# Patient Record
Sex: FEMALE | Race: BLACK OR AFRICAN AMERICAN | NOT HISPANIC OR LATINO | Employment: OTHER | RURAL
[De-identification: names, ages, dates, MRNs, and addresses within clinical notes are randomized per-mention and may not be internally consistent; named-entity substitution may affect disease eponyms.]

---

## 2017-08-01 ENCOUNTER — HISTORICAL (OUTPATIENT)
Dept: ADMINISTRATIVE | Facility: HOSPITAL | Age: 82
End: 2017-08-01

## 2017-08-04 LAB
LAB AP GENERAL CAT - HISTORICAL: NORMAL
LAB AP INTERPRETATION/RESULT - HISTORICAL: NORMAL
LAB AP SPECIMEN ADEQUACY - HISTORICAL: NORMAL
LAB AP SPECIMEN SUBMITTED - HISTORICAL: NORMAL

## 2017-10-09 ENCOUNTER — HISTORICAL (OUTPATIENT)
Dept: ADMINISTRATIVE | Facility: HOSPITAL | Age: 82
End: 2017-10-09

## 2017-10-10 LAB
LAB AP CLINICAL INFORMATION: NORMAL
LAB AP DIAGNOSIS - HISTORICAL: NORMAL
LAB AP GROSS PATHOLOGY - HISTORICAL: NORMAL
LAB AP SPECIMEN SUBMITTED - HISTORICAL: NORMAL

## 2020-05-31 ENCOUNTER — HISTORICAL (OUTPATIENT)
Dept: ADMINISTRATIVE | Facility: HOSPITAL | Age: 85
End: 2020-05-31

## 2020-06-01 LAB — TSH SERPL DL<=0.005 MIU/L-ACNC: 1.08 UIU/ML (ref 0.36–3.74)

## 2020-06-28 ENCOUNTER — HISTORICAL (OUTPATIENT)
Dept: ADMINISTRATIVE | Facility: HOSPITAL | Age: 85
End: 2020-06-28

## 2020-06-28 LAB
BUN SERPL-MCNC: 16 MG/DL (ref 7–17)
CALCIUM SERPL-MCNC: 9.1 MG/DL (ref 8.5–10.1)
CHLORIDE SERPL-SCNC: 102 MMOL/L (ref 98–107)
CO2 SERPL-SCNC: 32 MMOL/L (ref 21–32)
CREAT SERPL-MCNC: 1.25 MG/DL (ref 0.55–1.3)
GLUCOSE SERPL-MCNC: 129 MG/DL (ref 74–106)
POTASSIUM SERPL-SCNC: 4.4 MMOL/L (ref 3.5–5.1)
SODIUM SERPL-SCNC: 141 MMOL/L (ref 136–145)

## 2020-06-29 LAB
CHOLEST SERPL-MCNC: 180 MG/DL
VIT B12 SERPL-MCNC: 461 PG/ML (ref 193–986)

## 2020-06-30 LAB — GABAPENTIN SERPLBLD-MCNC: 5.9 UG/ML (ref 2–20)

## 2020-07-23 ENCOUNTER — HISTORICAL (OUTPATIENT)
Dept: ADMINISTRATIVE | Facility: HOSPITAL | Age: 85
End: 2020-07-23

## 2020-07-23 LAB
ALBUMIN SERPL BCP-MCNC: 3.4 G/DL (ref 3.4–5)
ALBUMIN/GLOB SERPL: 0.8 {RATIO}
ALP SERPL-CCNC: 128 U/L (ref 46–116)
ALT SERPL W P-5'-P-CCNC: 16 U/L (ref 14–63)
ANION GAP SERPL CALCULATED.3IONS-SCNC: 8 MMOL/L
AST SERPL W P-5'-P-CCNC: 21 U/L (ref 15–37)
BASOPHILS # BLD AUTO: 0.04 X10E3/UL (ref 0–0.2)
BASOPHILS NFR BLD AUTO: 0.9 % (ref 0–1)
BILIRUB SERPL-MCNC: 0.3 MG/DL (ref 0.2–1)
BUN SERPL-MCNC: 14 MG/DL (ref 7–17)
BUN/CREAT SERPL: 11.6
CALCIUM SERPL-MCNC: 9.2 MG/DL (ref 8.5–10.1)
CHLORIDE SERPL-SCNC: 104 MMOL/L (ref 98–107)
CO2 SERPL-SCNC: 31 MMOL/L (ref 21–32)
CREAT SERPL-MCNC: 1.21 MG/DL (ref 0.55–1.3)
EOSINOPHIL # BLD AUTO: 0.07 X10E3/UL (ref 0–0.5)
EOSINOPHIL NFR BLD AUTO: 1.6 % (ref 1–4)
ERYTHROCYTE [DISTWIDTH] IN BLOOD BY AUTOMATED COUNT: 19.4 % (ref 11.5–14.5)
GLOBULIN SER-MCNC: 4.3 G/DL
GLUCOSE SERPL-MCNC: 142 MG/DL (ref 74–106)
HCT VFR BLD AUTO: 32.3 % (ref 38–47)
HGB BLD-MCNC: 11.1 G/DL (ref 12–16)
HYPOCHROMIA BLD QL SMEAR: ABNORMAL
LYMPHOCYTES # BLD AUTO: 1.33 X10E3/UL (ref 1–4.8)
LYMPHOCYTES NFR BLD AUTO: 30.7 % (ref 27–41)
MCH RBC QN AUTO: 23.7 PG (ref 27–31)
MCHC RBC AUTO-ENTMCNC: 34.4 G/DL (ref 32–36)
MCV RBC AUTO: 69 FL (ref 80–96)
MICROCYTES BLD QL SMEAR: ABNORMAL
MONOCYTES # BLD AUTO: 0.41 X10E3/UL (ref 0–0.8)
MONOCYTES NFR BLD AUTO: 9.5 % (ref 2–6)
MPC BLD CALC-MCNC: 10.3 FL (ref 9.4–12.4)
NEUTROPHILS # BLD AUTO: 2.48 X10E3/UL (ref 1.8–7.7)
NEUTROPHILS NFR BLD AUTO: 57.3 % (ref 53–65)
NT-PROBNP SERPL-MCNC: 37 PG/ML (ref 5–450)
PLATELET # BLD AUTO: 258 X10E3/UL (ref 150–400)
PLATELET MORPHOLOGY: ABNORMAL
POTASSIUM SERPL-SCNC: 4.3 MMOL/L (ref 3.5–5.1)
PROT SERPL-MCNC: 7.7 G/DL (ref 6.4–8.2)
RBC # BLD AUTO: 4.68 X10E6/UL (ref 4.2–5.4)
SODIUM SERPL-SCNC: 139 MMOL/L (ref 136–145)
TARGETS BLD QL SMEAR: ABNORMAL
WBC # BLD AUTO: 4.33 X10E3/UL (ref 4.5–11)

## 2020-07-24 LAB
EST. AVERAGE GLUCOSE BLD GHB EST-MCNC: 117 MG/DL
HBA1C MFR BLD HPLC: 6.1 %

## 2020-08-26 ENCOUNTER — HISTORICAL (OUTPATIENT)
Dept: ADMINISTRATIVE | Facility: HOSPITAL | Age: 85
End: 2020-08-26

## 2020-08-26 LAB
ANISOCYTOSIS BLD QL SMEAR: ABNORMAL
BASOPHILS # BLD AUTO: 0.04 X10E3/UL (ref 0–0.2)
BASOPHILS NFR BLD AUTO: 0.8 % (ref 0–1)
EOSINOPHIL # BLD AUTO: 0.08 X10E3/UL (ref 0–0.5)
EOSINOPHIL NFR BLD AUTO: 1.6 % (ref 1–4)
ERYTHROCYTE [DISTWIDTH] IN BLOOD BY AUTOMATED COUNT: 19 % (ref 11.5–14.5)
HCT VFR BLD AUTO: 33.1 % (ref 38–47)
HGB BLD-MCNC: 11.5 G/DL (ref 12–16)
HYPOCHROMIA BLD QL SMEAR: ABNORMAL
LYMPHOCYTES # BLD AUTO: 1.47 X10E3/UL (ref 1–4.8)
LYMPHOCYTES NFR BLD AUTO: 30.2 % (ref 27–41)
MCH RBC QN AUTO: 24 PG (ref 27–31)
MCHC RBC AUTO-ENTMCNC: 34.7 G/DL (ref 32–36)
MCV RBC AUTO: 69 FL (ref 80–96)
MICROCYTES BLD QL SMEAR: ABNORMAL
MONOCYTES # BLD AUTO: 0.47 X10E3/UL (ref 0–0.8)
MONOCYTES NFR BLD AUTO: 9.7 % (ref 2–6)
MPC BLD CALC-MCNC: 10 FL (ref 9.4–12.4)
NEUTROPHILS # BLD AUTO: 2.81 X10E3/UL (ref 1.8–7.7)
NEUTROPHILS NFR BLD AUTO: 57.7 % (ref 53–65)
PLATELET # BLD AUTO: 258 X10E3/UL (ref 150–400)
PLATELET MORPHOLOGY: ABNORMAL
RBC # BLD AUTO: 4.8 X10E6/UL (ref 4.2–5.4)
TARGETS BLD QL SMEAR: ABNORMAL
WBC # BLD AUTO: 4.87 X10E3/UL (ref 4.5–11)

## 2020-08-27 LAB — TSH SERPL DL<=0.005 MIU/L-ACNC: 1.99 UIU/ML (ref 0.36–3.74)

## 2020-09-30 ENCOUNTER — HISTORICAL (OUTPATIENT)
Dept: ADMINISTRATIVE | Facility: HOSPITAL | Age: 85
End: 2020-09-30

## 2020-09-30 LAB
ALBUMIN SERPL BCP-MCNC: 3 G/DL (ref 3.4–5)
ALBUMIN/GLOB SERPL: 0.7 {RATIO}
ALP SERPL-CCNC: 139 U/L (ref 46–116)
ALT SERPL W P-5'-P-CCNC: 36 U/L (ref 14–63)
ANION GAP SERPL CALCULATED.3IONS-SCNC: 12 MMOL/L
ANISOCYTOSIS BLD QL SMEAR: ABNORMAL
AST SERPL W P-5'-P-CCNC: 40 U/L (ref 15–37)
BASOPHILS # BLD AUTO: 0.01 X10E3/UL (ref 0–0.2)
BASOPHILS NFR BLD AUTO: 0.2 % (ref 0–1)
BILIRUB SERPL-MCNC: 0.6 MG/DL (ref 0.2–1)
BUN SERPL-MCNC: 20 MG/DL (ref 7–17)
BUN/CREAT SERPL: 20.8
CALCIUM SERPL-MCNC: 9.4 MG/DL (ref 8.5–10.1)
CHLORIDE SERPL-SCNC: 108 MMOL/L (ref 98–107)
CO2 SERPL-SCNC: 32 MMOL/L (ref 21–32)
CREAT SERPL-MCNC: 0.96 MG/DL (ref 0.55–1.3)
D DIMER PPP FEU-MCNC: 1.27 UG/ML (ref 0–0.47)
EOSINOPHIL # BLD AUTO: 0 X10E3/UL (ref 0–0.5)
EOSINOPHIL NFR BLD AUTO: 0 % (ref 1–4)
ERYTHROCYTE [DISTWIDTH] IN BLOOD BY AUTOMATED COUNT: 18.1 % (ref 11.5–14.5)
GLOBULIN SER-MCNC: 4.1 G/DL
GLUCOSE SERPL-MCNC: 130 MG/DL (ref 74–106)
HCT VFR BLD AUTO: 32.8 % (ref 38–47)
HGB BLD-MCNC: 11.4 G/DL (ref 12–16)
HYPOCHROMIA BLD QL SMEAR: ABNORMAL
LYMPHOCYTES # BLD AUTO: 0.71 X10E3/UL (ref 1–4.8)
LYMPHOCYTES NFR BLD AUTO: 15 % (ref 27–41)
LYMPHOCYTES NFR BLD MANUAL: 12 % (ref 27–41)
MCH RBC QN AUTO: 23.6 PG (ref 27–31)
MCHC RBC AUTO-ENTMCNC: 34.8 G/DL (ref 32–36)
MCV RBC AUTO: 68 FL (ref 80–96)
MICROCYTES BLD QL SMEAR: ABNORMAL
MONOCYTES # BLD AUTO: 0.7 X10E3/UL (ref 0–0.8)
MONOCYTES NFR BLD AUTO: 14.8 % (ref 2–6)
MONOCYTES NFR BLD MANUAL: 13 % (ref 2–6)
MPC BLD CALC-MCNC: 10.3 FL (ref 9.4–12.4)
NEUTROPHILS # BLD AUTO: 3.3 X10E3/UL (ref 1.8–7.7)
NEUTROPHILS NFR BLD AUTO: 70 % (ref 53–65)
NEUTS BAND NFR BLD MANUAL: 1 % (ref 1–5)
NEUTS SEG NFR BLD MANUAL: 74 % (ref 50–62)
PLATELET # BLD AUTO: 247 X10E3/UL (ref 150–400)
PLATELET MORPHOLOGY: ABNORMAL
POTASSIUM SERPL-SCNC: 4.4 MMOL/L (ref 3.5–5.1)
PROT SERPL-MCNC: 7.1 G/DL (ref 6.4–8.2)
RBC # BLD AUTO: 4.84 X10E6/UL (ref 4.2–5.4)
SODIUM SERPL-SCNC: 148 MMOL/L (ref 136–145)
TARGETS BLD QL SMEAR: ABNORMAL
WBC # BLD AUTO: 4.72 X10E3/UL (ref 4.5–11)

## 2020-10-02 ENCOUNTER — HISTORICAL (OUTPATIENT)
Dept: ADMINISTRATIVE | Facility: HOSPITAL | Age: 85
End: 2020-10-02

## 2020-10-02 LAB
ALBUMIN SERPL BCP-MCNC: 2.8 G/DL (ref 3.4–5)
ALBUMIN/GLOB SERPL: 0.7 {RATIO}
ALP SERPL-CCNC: 128 U/L (ref 46–116)
ALT SERPL W P-5'-P-CCNC: 28 U/L (ref 14–63)
ANION GAP SERPL CALCULATED.3IONS-SCNC: 8 MMOL/L
AST SERPL W P-5'-P-CCNC: 31 U/L (ref 15–37)
BACTERIA #/AREA URNS HPF: ABNORMAL /HPF
BASOPHILS # BLD AUTO: 0.01 X10E3/UL (ref 0–0.2)
BASOPHILS NFR BLD AUTO: 0.2 % (ref 0–1)
BILIRUB SERPL-MCNC: 0.5 MG/DL (ref 0.2–1)
BILIRUB UR QL STRIP: ABNORMAL MG/DL
BUN SERPL-MCNC: 20 MG/DL (ref 7–17)
BUN/CREAT SERPL: 19.8
CALCIUM SERPL-MCNC: 8.8 MG/DL (ref 8.5–10.1)
CHLORIDE SERPL-SCNC: 109 MMOL/L (ref 98–107)
CLARITY UR: ABNORMAL
CLARITY UR: ABNORMAL
CO2 SERPL-SCNC: 35 MMOL/L (ref 21–32)
COLOR UR: ABNORMAL
COLOR UR: ABNORMAL
CREAT SERPL-MCNC: 1.01 MG/DL (ref 0.55–1.3)
CRP SERPL-MCNC: 3.41 MG/DL (ref 0–0.8)
EOSINOPHIL # BLD AUTO: 0.01 X10E3/UL (ref 0–0.5)
EOSINOPHIL NFR BLD AUTO: 0.2 % (ref 1–4)
EOSINOPHIL NFR BLD MANUAL: 2 % (ref 1–4)
ERYTHROCYTE [DISTWIDTH] IN BLOOD BY AUTOMATED COUNT: 18.1 % (ref 11.5–14.5)
GLOBULIN SER-MCNC: 3.9 G/DL
GLUCOSE SERPL-MCNC: 91 MG/DL (ref 74–106)
GLUCOSE UR STRIP-MCNC: NORMAL MG/DL
HCT VFR BLD AUTO: 33 % (ref 38–47)
HGB BLD-MCNC: 11.3 G/DL (ref 12–16)
HYPOCHROMIA BLD QL SMEAR: ABNORMAL
KETONES UR STRIP-SCNC: NEGATIVE MG/DL
LEUKOCYTE ESTERASE UR QL STRIP: NEGATIVE LEU/UL
LYMPHOCYTES # BLD AUTO: 0.75 X10E3/UL (ref 1–4.8)
LYMPHOCYTES NFR BLD AUTO: 17 % (ref 27–41)
LYMPHOCYTES NFR BLD MANUAL: 15 % (ref 27–41)
MCH RBC QN AUTO: 23.5 PG (ref 27–31)
MCHC RBC AUTO-ENTMCNC: 34.2 G/DL (ref 32–36)
MCV RBC AUTO: 69 FL (ref 80–96)
MICROCYTES BLD QL SMEAR: ABNORMAL
MONOCYTES # BLD AUTO: 0.65 X10E3/UL (ref 0–0.8)
MONOCYTES NFR BLD AUTO: 14.7 % (ref 2–6)
MONOCYTES NFR BLD MANUAL: 13 % (ref 2–6)
MPC BLD CALC-MCNC: 9.7 FL (ref 9.4–12.4)
MUCOUS THREADS #/AREA URNS HPF: ABNORMAL /HPF
NEUTROPHILS # BLD AUTO: 3 X10E3/UL (ref 1.8–7.7)
NEUTROPHILS NFR BLD AUTO: 67.9 % (ref 53–65)
NEUTS SEG NFR BLD MANUAL: 70 % (ref 50–62)
NITRITE UR QL STRIP: NEGATIVE MG/DL
PH UR STRIP: 6 PH UNITS (ref 5–8)
PLATELET # BLD AUTO: 269 X10E3/UL (ref 150–400)
PLATELET MORPHOLOGY: NORMAL
POTASSIUM SERPL-SCNC: 3.9 MMOL/L (ref 3.5–5.1)
PROT SERPL-MCNC: 6.7 G/DL (ref 6.4–8.2)
PROT UR QL STRIP: >=300 MG/DL
RBC # BLD AUTO: 4.8 X10E6/UL (ref 4.2–5.4)
RBC # UR STRIP: NEGATIVE ERY/UL
RBC #/AREA URNS HPF: ABNORMAL /HPF (ref 0–3)
SODIUM SERPL-SCNC: 148 MMOL/L (ref 136–145)
SP GR UR STRIP: 1.02 (ref 1–1.03)
SQUAMOUS #/AREA URNS LPF: ABNORMAL /LPF
TARGETS BLD QL SMEAR: ABNORMAL
UROBILINOGEN UR STRIP-ACNC: 4 MG/DL
WBC # BLD AUTO: 4.42 X10E3/UL (ref 4.5–11)
WBC #/AREA URNS HPF: ABNORMAL /HPF (ref 0–5)

## 2020-10-03 ENCOUNTER — HISTORICAL (OUTPATIENT)
Dept: ADMINISTRATIVE | Facility: HOSPITAL | Age: 85
End: 2020-10-03

## 2020-10-15 ENCOUNTER — HISTORICAL (OUTPATIENT)
Dept: ADMINISTRATIVE | Facility: HOSPITAL | Age: 85
End: 2020-10-15

## 2020-10-15 LAB
ALBUMIN SERPL BCP-MCNC: 2.2 G/DL (ref 3.4–5)
ALBUMIN SERPL BCP-MCNC: 2.3 G/DL (ref 3.4–5)
ALBUMIN/GLOB SERPL: 0.4 {RATIO}
ALBUMIN/GLOB SERPL: 0.4 {RATIO}
ALP SERPL-CCNC: 177 U/L (ref 46–116)
ALP SERPL-CCNC: 212 U/L (ref 46–116)
ALT SERPL W P-5'-P-CCNC: 102 U/L (ref 14–63)
ALT SERPL W P-5'-P-CCNC: 116 U/L (ref 14–63)
AMORPH PHOS CRY #/AREA URNS LPF: ABNORMAL /LPF
AMORPH PHOS CRY #/AREA URNS LPF: ABNORMAL /LPF
ANION GAP SERPL CALCULATED.3IONS-SCNC: 14 MMOL/L
ANION GAP SERPL CALCULATED.3IONS-SCNC: 15 MMOL/L
AST SERPL W P-5'-P-CCNC: 115 U/L (ref 15–37)
AST SERPL W P-5'-P-CCNC: 75 U/L (ref 15–37)
BACTERIA #/AREA URNS HPF: ABNORMAL /HPF
BACTERIA #/AREA URNS HPF: ABNORMAL /HPF
BASOPHILS # BLD AUTO: 0 X10E3/UL (ref 0–0.2)
BASOPHILS NFR BLD AUTO: 0 % (ref 0–1)
BILIRUB SERPL-MCNC: 1 MG/DL (ref 0.2–1)
BILIRUB SERPL-MCNC: 1.2 MG/DL (ref 0.2–1)
BILIRUB UR QL STRIP: NEGATIVE MG/DL
BILIRUB UR QL STRIP: NEGATIVE MG/DL
BUN SERPL-MCNC: 37 MG/DL (ref 7–17)
BUN SERPL-MCNC: 37 MG/DL (ref 7–17)
BUN SERPL-MCNC: 38 MG/DL (ref 7–17)
BUN/CREAT SERPL: 28.4
BUN/CREAT SERPL: 28.9
CALCIUM SERPL-MCNC: 10.3 MG/DL (ref 8.5–10.1)
CALCIUM SERPL-MCNC: 10.4 MG/DL (ref 8.5–10.1)
CHLORIDE SERPL-SCNC: 105 MMOL/L (ref 98–107)
CHLORIDE SERPL-SCNC: 106 MMOL/L (ref 98–107)
CLARITY UR: ABNORMAL
CLARITY UR: CLEAR
CLARITY UR: CLEAR
CO2 SERPL-SCNC: 28 MMOL/L (ref 21–32)
CO2 SERPL-SCNC: 29 MMOL/L (ref 21–32)
COLOR UR: ABNORMAL
CREAT SERPL-MCNC: 1.23 MG/DL (ref 0.55–1.3)
CREAT SERPL-MCNC: 1.28 MG/DL (ref 0.55–1.3)
CREAT SERPL-MCNC: 1.34 MG/DL (ref 0.55–1.3)
EOSINOPHIL # BLD AUTO: 0 X10E3/UL (ref 0–0.5)
EOSINOPHIL NFR BLD AUTO: 0 % (ref 1–4)
ERYTHROCYTE [DISTWIDTH] IN BLOOD BY AUTOMATED COUNT: 18.7 % (ref 11.5–14.5)
GLOBULIN SER-MCNC: 5.5 G/DL
GLOBULIN SER-MCNC: 5.8 G/DL
GLUCOSE SERPL-MCNC: 185 MG/DL (ref 74–106)
GLUCOSE SERPL-MCNC: 231 MG/DL (ref 74–106)
GLUCOSE UR STRIP-MCNC: NEGATIVE MG/DL
GLUCOSE UR STRIP-MCNC: NORMAL MG/DL
GRAN CASTS #/AREA URNS LPF: ABNORMAL /LPF
HCT VFR BLD AUTO: 36.6 % (ref 38–47)
HGB BLD-MCNC: 13 G/DL (ref 12–16)
HYALINE CASTS #/AREA URNS LPF: ABNORMAL /LPF (ref 0–2)
KETONES UR STRIP-SCNC: NEGATIVE MG/DL
KETONES UR STRIP-SCNC: NEGATIVE MG/DL
LEUKOCYTE ESTERASE UR QL STRIP: NEGATIVE LEU/UL
LEUKOCYTE ESTERASE UR QL STRIP: NEGATIVE LEU/UL
LYMPHOCYTES # BLD AUTO: 0.58 X10E3/UL (ref 1–4.8)
LYMPHOCYTES NFR BLD AUTO: 4.9 % (ref 27–41)
MCH RBC QN AUTO: 23.6 PG (ref 27–31)
MCHC RBC AUTO-ENTMCNC: 35.5 G/DL (ref 32–36)
MCV RBC AUTO: 66 FL (ref 80–96)
MONOCYTES # BLD AUTO: 1.39 X10E3/UL (ref 0–0.8)
MONOCYTES NFR BLD AUTO: 11.8 % (ref 2–6)
MPC BLD CALC-MCNC: 10.2 FL (ref 9.4–12.4)
MUCOUS THREADS #/AREA URNS HPF: ABNORMAL /HPF
NEUTROPHILS # BLD AUTO: 9.8 X10E3/UL (ref 1.8–7.7)
NEUTROPHILS NFR BLD AUTO: 83.3 % (ref 53–65)
NITRITE UR QL STRIP: NEGATIVE
NITRITE UR QL STRIP: NEGATIVE MG/DL
NT-PROBNP SERPL-MCNC: 662 PG/ML (ref 5–450)
PH UR STRIP: 5.5 PH UNITS (ref 5–8)
PH UR STRIP: 5.5 PH UNITS (ref 5–8)
PLATELET # BLD AUTO: 305 X10E3/UL (ref 150–400)
POTASSIUM SERPL-SCNC: 3.9 MMOL/L (ref 3.5–5.1)
POTASSIUM SERPL-SCNC: 4.2 MMOL/L (ref 3.5–5.1)
PROT SERPL-MCNC: 7.7 G/DL (ref 6.4–8.2)
PROT SERPL-MCNC: 8.1 G/DL (ref 6.4–8.2)
PROT UR QL STRIP: 100 MG/DL
PROT UR QL STRIP: 100 MG/DL
RBC # BLD AUTO: 5.51 X10E6/UL (ref 4.2–5.4)
RBC # UR STRIP: ABNORMAL ERY/UL
RBC # UR STRIP: NEGATIVE ERY/UL
RBC #/AREA URNS HPF: ABNORMAL /HPF (ref 0–3)
RBC #/AREA URNS HPF: ABNORMAL /HPF (ref 0–3)
SARS-COV-2 RNA AMPLIFICATION, QUAL: POSITIVE
SODIUM SERPL-SCNC: 144 MMOL/L (ref 136–145)
SODIUM SERPL-SCNC: 145 MMOL/L (ref 136–145)
SP GR UR STRIP: 1.02 (ref 1–1.03)
SP GR UR STRIP: >=1.03 (ref 1–1.03)
SQUAMOUS #/AREA URNS LPF: ABNORMAL /LPF
SQUAMOUS #/AREA URNS LPF: ABNORMAL /LPF
TRICHOMONAS #/AREA URNS HPF: ABNORMAL /HPF
TROPONIN I SERPL-MCNC: <0.017 NG/ML (ref 0–0.06)
TROPONIN I SERPL-MCNC: <0.017 NG/ML (ref 0–0.06)
UROBILINOGEN UR STRIP-ACNC: 0.2 MG/DL
UROBILINOGEN UR STRIP-ACNC: 1 EU/DL
WBC # BLD AUTO: 11.77 X10E3/UL (ref 4.5–11)
WBC #/AREA URNS HPF: ABNORMAL /HPF (ref 0–5)
WBC #/AREA URNS HPF: ABNORMAL /HPF (ref 0–5)
YEAST #/AREA URNS HPF: ABNORMAL /HPF

## 2020-10-16 ENCOUNTER — HISTORICAL (OUTPATIENT)
Dept: ADMINISTRATIVE | Facility: HOSPITAL | Age: 85
End: 2020-10-16

## 2020-10-16 LAB
ANION GAP SERPL CALCULATED.3IONS-SCNC: 13 MMOL/L
ANISOCYTOSIS BLD QL SMEAR: ABNORMAL
APTT PPP: 50.8 SECONDS (ref 25.2–37.3)
APTT PPP: 56.4 SECONDS (ref 25.2–37.3)
APTT PPP: 61.2 SECONDS (ref 25.2–37.3)
BASOPHILS # BLD AUTO: 0.01 X10E3/UL (ref 0–0.2)
BASOPHILS NFR BLD AUTO: 0.1 % (ref 0–1)
BUN SERPL-MCNC: 27 MG/DL (ref 7–18)
CALCIUM SERPL-MCNC: 9.4 MG/DL (ref 8.5–10.1)
CHLORIDE SERPL-SCNC: 108 MMOL/L (ref 98–107)
CO2 SERPL-SCNC: 30 MMOL/L (ref 21–32)
CREAT SERPL-MCNC: 1.06 MG/DL (ref 0.55–1.02)
CRENATED CELLS: ABNORMAL
EOSINOPHIL # BLD AUTO: 0 X10E3/UL (ref 0–0.5)
EOSINOPHIL NFR BLD AUTO: 0 % (ref 1–4)
ERYTHROCYTE [DISTWIDTH] IN BLOOD BY AUTOMATED COUNT: 17 % (ref 11.5–14.5)
GLUCOSE SERPL-MCNC: 275 MG/DL (ref 74–106)
HCT VFR BLD AUTO: 37.3 % (ref 38–47)
HGB BLD-MCNC: 12.9 G/DL (ref 12–16)
IMM GRANULOCYTES # BLD AUTO: 0.08 X10E3/UL (ref 0–0.04)
IMM GRANULOCYTES NFR BLD: 0.5 % (ref 0–0.4)
INR BLD: 1.43 (ref 0–3.3)
LYMPHOCYTES # BLD AUTO: 0.46 X10E3/UL (ref 1–4.8)
LYMPHOCYTES NFR BLD AUTO: 3.1 % (ref 27–41)
LYMPHOCYTES NFR BLD MANUAL: 5 % (ref 27–41)
MAGNESIUM SERPL-MCNC: 2.4 MG/DL (ref 1.7–2.3)
MCH RBC QN AUTO: 23.5 PG (ref 27–31)
MCHC RBC AUTO-ENTMCNC: 34.6 G/DL (ref 32–36)
MCV RBC AUTO: 67.9 FL (ref 80–96)
MICROCYTES BLD QL SMEAR: ABNORMAL
MONOCYTES # BLD AUTO: 1.13 X10E3/UL (ref 0–0.8)
MONOCYTES NFR BLD AUTO: 7.7 % (ref 2–6)
MONOCYTES NFR BLD MANUAL: 9 % (ref 2–6)
MPC BLD CALC-MCNC: 10.5 FL (ref 9.4–12.4)
NEUTROPHILS # BLD AUTO: 12.98 X10E3/UL (ref 1.8–7.7)
NEUTROPHILS NFR BLD AUTO: 88.6 % (ref 53–65)
NEUTS BAND NFR BLD MANUAL: 3 % (ref 1–5)
NEUTS SEG NFR BLD MANUAL: 83 % (ref 50–62)
NRBC # BLD AUTO: 0 X10E3/UL (ref 0–0)
NRBC, AUTO (.00): 0 /100 (ref 0–0)
PLATELET # BLD AUTO: 316 X10E3/UL (ref 150–400)
PLATELET MORPHOLOGY: ABNORMAL
POLYCHROMASIA BLD QL SMEAR: ABNORMAL
POTASSIUM SERPL-SCNC: 3.8 MMOL/L (ref 3.5–5.1)
PROTHROMBIN TIME: 16.7 SECONDS (ref 11.7–14.7)
RBC # BLD AUTO: 5.49 X10E6/UL (ref 4.2–5.4)
SODIUM SERPL-SCNC: 147 MMOL/L (ref 136–145)
TARGETS BLD QL SMEAR: ABNORMAL
TROPONIN I SERPL-MCNC: <0.017 NG/ML (ref 0–0.06)
TROPONIN I SERPL-MCNC: <0.017 NG/ML (ref 0–0.06)
TSH SERPL DL<=0.005 MIU/L-ACNC: 0.18 UIU/ML (ref 0.36–3.74)
WBC # BLD AUTO: 14.66 X10E3/UL (ref 4.5–11)

## 2020-10-17 ENCOUNTER — HISTORICAL (OUTPATIENT)
Dept: ADMINISTRATIVE | Facility: HOSPITAL | Age: 85
End: 2020-10-17

## 2020-10-17 LAB
APTT PPP: 103.5 SECONDS (ref 25.2–37.3)
APTT PPP: 106.2 SECONDS (ref 25.2–37.3)
APTT PPP: 72.4 SECONDS (ref 25.2–37.3)

## 2020-10-18 ENCOUNTER — HISTORICAL (OUTPATIENT)
Dept: ADMINISTRATIVE | Facility: HOSPITAL | Age: 85
End: 2020-10-18

## 2020-10-18 LAB
APTT PPP: 28.6 SECONDS (ref 25.2–37.3)
APTT PPP: 31.8 SECONDS (ref 25.2–37.3)
APTT PPP: 33.7 SECONDS (ref 25.2–37.3)
APTT PPP: 33.7 SECONDS (ref 25.2–37.3)
REPORT: NO GROWTH
REPORT: NORMAL

## 2020-10-19 ENCOUNTER — HISTORICAL (OUTPATIENT)
Dept: ADMINISTRATIVE | Facility: HOSPITAL | Age: 85
End: 2020-10-19

## 2020-10-19 LAB
APTT PPP: 31.4 SECONDS (ref 25.2–37.3)
APTT PPP: 33.2 SECONDS (ref 25.2–37.3)
APTT PPP: 33.2 SECONDS (ref 25.2–37.3)
APTT PPP: 34.8 SECONDS (ref 25.2–37.3)

## 2020-10-20 ENCOUNTER — HISTORICAL (OUTPATIENT)
Dept: ADMINISTRATIVE | Facility: HOSPITAL | Age: 85
End: 2020-10-20

## 2020-10-20 LAB
ALBUMIN SERPL BCP-MCNC: 2.1 G/DL (ref 3.5–5)
ALBUMIN/GLOB SERPL: 0.4 {RATIO}
ALP SERPL-CCNC: 136 U/L (ref 55–142)
ALT SERPL W P-5'-P-CCNC: 52 U/L (ref 13–56)
ANION GAP SERPL CALCULATED.3IONS-SCNC: 11 MMOL/L
ANISOCYTOSIS BLD QL SMEAR: ABNORMAL
APTT PPP: 27.5 SECONDS (ref 25.2–37.3)
APTT PPP: 30.5 SECONDS (ref 25.2–37.3)
APTT PPP: 30.7 SECONDS (ref 25.2–37.3)
APTT PPP: 31.7 SECONDS (ref 25.2–37.3)
AST SERPL W P-5'-P-CCNC: 31 U/L (ref 15–37)
BASOPHILS # BLD AUTO: 0.01 X10E3/UL (ref 0–0.2)
BASOPHILS NFR BLD AUTO: 0.1 % (ref 0–1)
BILIRUB SERPL-MCNC: 0.4 MG/DL (ref 0–1.2)
BUN SERPL-MCNC: 48 MG/DL (ref 7–18)
BUN/CREAT SERPL: 45.3
CALCIUM SERPL-MCNC: 9.2 MG/DL (ref 8.5–10.1)
CHLORIDE SERPL-SCNC: 113 MMOL/L (ref 98–107)
CO2 SERPL-SCNC: 33 MMOL/L (ref 21–32)
CREAT SERPL-MCNC: 1.06 MG/DL (ref 0.55–1.02)
EOSINOPHIL # BLD AUTO: 0 X10E3/UL (ref 0–0.5)
EOSINOPHIL NFR BLD AUTO: 0 % (ref 1–4)
ERYTHROCYTE [DISTWIDTH] IN BLOOD BY AUTOMATED COUNT: 17.4 % (ref 11.5–14.5)
EST. AVERAGE GLUCOSE BLD GHB EST-MCNC: 160 MG/DL
GLOBULIN SER-MCNC: 4.7 G/DL (ref 2–4)
GLUCOSE SERPL-MCNC: 262 MG/DL (ref 74–106)
HBA1C MFR BLD HPLC: 7.4 %
HCT VFR BLD AUTO: 35.4 % (ref 38–47)
HGB BLD-MCNC: 11.9 G/DL (ref 12–16)
HYPOCHROMIA BLD QL SMEAR: SLIGHT
IMM GRANULOCYTES # BLD AUTO: 0.07 X10E3/UL (ref 0–0.04)
IMM GRANULOCYTES NFR BLD: 0.7 % (ref 0–0.4)
LYMPHOCYTES # BLD AUTO: 0.38 X10E3/UL (ref 1–4.8)
LYMPHOCYTES NFR BLD AUTO: 3.8 % (ref 27–41)
LYMPHOCYTES NFR BLD MANUAL: 4 % (ref 27–41)
MCH RBC QN AUTO: 23.5 PG (ref 27–31)
MCHC RBC AUTO-ENTMCNC: 33.6 G/DL (ref 32–36)
MCV RBC AUTO: 70 FL (ref 80–96)
MICROCYTES BLD QL SMEAR: ABNORMAL
MONOCYTES # BLD AUTO: 0.62 X10E3/UL (ref 0–0.8)
MONOCYTES NFR BLD AUTO: 6.2 % (ref 2–6)
MONOCYTES NFR BLD MANUAL: 5 % (ref 2–6)
MPC BLD CALC-MCNC: 10.4 FL (ref 9.4–12.4)
NEUTROPHILS # BLD AUTO: 8.92 X10E3/UL (ref 1.8–7.7)
NEUTROPHILS NFR BLD AUTO: 89.2 % (ref 53–65)
NEUTS BAND NFR BLD MANUAL: 1 % (ref 1–5)
NEUTS SEG NFR BLD MANUAL: 90 % (ref 50–62)
NRBC # BLD AUTO: 0 X10E3/UL (ref 0–0)
NRBC, AUTO (.00): 0 /100 (ref 0–0)
PLATELET # BLD AUTO: 337 X10E3/UL (ref 150–400)
PLATELET MORPHOLOGY: ABNORMAL
POTASSIUM SERPL-SCNC: 3.9 MMOL/L (ref 3.5–5.1)
PROT SERPL-MCNC: 6.8 G/DL (ref 6.4–8.2)
RBC # BLD AUTO: 5.06 X10E6/UL (ref 4.2–5.4)
SODIUM SERPL-SCNC: 153 MMOL/L (ref 136–145)
TARGETS BLD QL SMEAR: ABNORMAL
TSH SERPL DL<=0.005 MIU/L-ACNC: 0.22 UIU/ML (ref 0.36–3.74)
WBC # BLD AUTO: 10 X10E3/UL (ref 4.5–11)

## 2020-10-21 ENCOUNTER — HISTORICAL (OUTPATIENT)
Dept: ADMINISTRATIVE | Facility: HOSPITAL | Age: 85
End: 2020-10-21

## 2020-10-21 LAB
ALBUMIN SERPL BCP-MCNC: 2.4 G/DL (ref 3.5–5)
ALBUMIN/GLOB SERPL: 0.5 {RATIO}
ALP SERPL-CCNC: 146 U/L (ref 55–142)
ALT SERPL W P-5'-P-CCNC: 49 U/L (ref 13–56)
ANION GAP SERPL CALCULATED.3IONS-SCNC: 13 MMOL/L
ANISOCYTOSIS BLD QL SMEAR: ABNORMAL
APTT PPP: 30 SECONDS (ref 25.2–37.3)
AST SERPL W P-5'-P-CCNC: 29 U/L (ref 15–37)
BASOPHILS # BLD AUTO: 0.02 X10E3/UL (ref 0–0.2)
BASOPHILS NFR BLD AUTO: 0.1 % (ref 0–1)
BILIRUB SERPL-MCNC: 0.5 MG/DL (ref 0–1.2)
BUN SERPL-MCNC: 46 MG/DL (ref 7–18)
BUN/CREAT SERPL: 39.3
CALCIUM SERPL-MCNC: 9.6 MG/DL (ref 8.5–10.1)
CHLORIDE SERPL-SCNC: 114 MMOL/L (ref 98–107)
CO2 SERPL-SCNC: 32 MMOL/L (ref 21–32)
CREAT SERPL-MCNC: 1.17 MG/DL (ref 0.55–1.02)
CRENATED CELLS: ABNORMAL
EOSINOPHIL # BLD AUTO: 0.01 X10E3/UL (ref 0–0.5)
EOSINOPHIL NFR BLD AUTO: 0.1 % (ref 1–4)
ERYTHROCYTE [DISTWIDTH] IN BLOOD BY AUTOMATED COUNT: 17.7 % (ref 11.5–14.5)
GLOBULIN SER-MCNC: 4.8 G/DL (ref 2–4)
GLUCOSE SERPL-MCNC: 262 MG/DL (ref 74–106)
HCT VFR BLD AUTO: 36.1 % (ref 38–47)
HGB BLD-MCNC: 12.2 G/DL (ref 12–16)
HYPOCHROMIA BLD QL SMEAR: SLIGHT
IMM GRANULOCYTES # BLD AUTO: 0.12 X10E3/UL (ref 0–0.04)
IMM GRANULOCYTES NFR BLD: 0.9 % (ref 0–0.4)
LYMPHOCYTES # BLD AUTO: 0.56 X10E3/UL (ref 1–4.8)
LYMPHOCYTES NFR BLD AUTO: 4.1 % (ref 27–41)
LYMPHOCYTES NFR BLD MANUAL: 4 % (ref 27–41)
MCH RBC QN AUTO: 23.9 PG (ref 27–31)
MCHC RBC AUTO-ENTMCNC: 33.8 G/DL (ref 32–36)
MCV RBC AUTO: 70.8 FL (ref 80–96)
MICROCYTES BLD QL SMEAR: ABNORMAL
MONOCYTES # BLD AUTO: 0.95 X10E3/UL (ref 0–0.8)
MONOCYTES NFR BLD AUTO: 6.9 % (ref 2–6)
MONOCYTES NFR BLD MANUAL: 7 % (ref 2–6)
MPC BLD CALC-MCNC: 10.9 FL (ref 9.4–12.4)
NEUTROPHILS # BLD AUTO: 12.16 X10E3/UL (ref 1.8–7.7)
NEUTROPHILS NFR BLD AUTO: 87.9 % (ref 53–65)
NEUTS BAND NFR BLD MANUAL: 1 % (ref 1–5)
NEUTS SEG NFR BLD MANUAL: 88 % (ref 50–62)
NRBC # BLD AUTO: 0 X10E3/UL (ref 0–0)
NRBC BLD MANUAL-RTO: 1 /100 (ref 0–0)
NRBC, AUTO (.00): 0 /100 (ref 0–0)
PLATELET # BLD AUTO: 321 X10E3/UL (ref 150–400)
PLATELET MORPHOLOGY: ABNORMAL
POTASSIUM SERPL-SCNC: 3.6 MMOL/L (ref 3.5–5.1)
PREALB SERPL NEPH-MCNC: 20 MG/DL (ref 20–40)
PROT SERPL-MCNC: 7.2 G/DL (ref 6.4–8.2)
RBC # BLD AUTO: 5.1 X10E6/UL (ref 4.2–5.4)
SCHISTOCYTES BLD QL AUTO: ABNORMAL
SODIUM SERPL-SCNC: 155 MMOL/L (ref 136–145)
TARGETS BLD QL SMEAR: ABNORMAL
WBC # BLD AUTO: 13.82 X10E3/UL (ref 4.5–11)

## 2020-10-22 ENCOUNTER — HISTORICAL (OUTPATIENT)
Dept: ADMINISTRATIVE | Facility: HOSPITAL | Age: 85
End: 2020-10-22

## 2020-10-22 LAB
ANION GAP SERPL CALCULATED.3IONS-SCNC: 11 MMOL/L
ANISOCYTOSIS BLD QL SMEAR: ABNORMAL
BASOPHILS # BLD AUTO: 0.01 X10E3/UL (ref 0–0.2)
BASOPHILS NFR BLD AUTO: 0.1 % (ref 0–1)
BUN SERPL-MCNC: 44 MG/DL (ref 7–18)
CALCIUM SERPL-MCNC: 9.1 MG/DL (ref 8.5–10.1)
CHLORIDE SERPL-SCNC: 116 MMOL/L (ref 98–107)
CO2 SERPL-SCNC: 33 MMOL/L (ref 21–32)
CREAT SERPL-MCNC: 1.03 MG/DL (ref 0.55–1.02)
EOSINOPHIL # BLD AUTO: 0 X10E3/UL (ref 0–0.5)
EOSINOPHIL NFR BLD AUTO: 0 % (ref 1–4)
ERYTHROCYTE [DISTWIDTH] IN BLOOD BY AUTOMATED COUNT: 18.6 % (ref 11.5–14.5)
GLUCOSE SERPL-MCNC: 261 MG/DL (ref 74–106)
HCT VFR BLD AUTO: 37.7 % (ref 38–47)
HGB BLD-MCNC: 12.5 G/DL (ref 12–16)
HYPOCHROMIA BLD QL SMEAR: SLIGHT
IMM GRANULOCYTES # BLD AUTO: 0.1 X10E3/UL (ref 0–0.04)
IMM GRANULOCYTES NFR BLD: 0.8 % (ref 0–0.4)
LYMPHOCYTES # BLD AUTO: 0.57 X10E3/UL (ref 1–4.8)
LYMPHOCYTES NFR BLD AUTO: 4.6 % (ref 27–41)
LYMPHOCYTES NFR BLD MANUAL: 5 % (ref 27–41)
MCH RBC QN AUTO: 23.3 PG (ref 27–31)
MCHC RBC AUTO-ENTMCNC: 33.2 G/DL (ref 32–36)
MCV RBC AUTO: 70.2 FL (ref 80–96)
MICROCYTES BLD QL SMEAR: ABNORMAL
MONOCYTES # BLD AUTO: 0.57 X10E3/UL (ref 0–0.8)
MONOCYTES NFR BLD AUTO: 4.6 % (ref 2–6)
MONOCYTES NFR BLD MANUAL: 4 % (ref 2–6)
MPC BLD CALC-MCNC: 10.3 FL (ref 9.4–12.4)
NEUTROPHILS # BLD AUTO: 11.21 X10E3/UL (ref 1.8–7.7)
NEUTROPHILS NFR BLD AUTO: 89.9 % (ref 53–65)
NEUTS SEG NFR BLD MANUAL: 91 % (ref 50–62)
NRBC # BLD AUTO: 0 X10E3/UL (ref 0–0)
NRBC, AUTO (.00): 0 /100 (ref 0–0)
PLATELET # BLD AUTO: 296 X10E3/UL (ref 150–400)
PLATELET MORPHOLOGY: ABNORMAL
POTASSIUM SERPL-SCNC: 4.2 MMOL/L (ref 3.5–5.1)
RBC # BLD AUTO: 5.37 X10E6/UL (ref 4.2–5.4)
SODIUM SERPL-SCNC: 156 MMOL/L (ref 136–145)
TARGETS BLD QL SMEAR: ABNORMAL
WBC # BLD AUTO: 12.46 X10E3/UL (ref 4.5–11)

## 2020-10-23 ENCOUNTER — HISTORICAL (OUTPATIENT)
Dept: ADMINISTRATIVE | Facility: HOSPITAL | Age: 85
End: 2020-10-23

## 2020-10-23 LAB
ANION GAP SERPL CALCULATED.3IONS-SCNC: 11 MMOL/L
ANISOCYTOSIS BLD QL SMEAR: ABNORMAL
BASOPHILS # BLD AUTO: 0.01 X10E3/UL (ref 0–0.2)
BASOPHILS NFR BLD AUTO: 0.1 % (ref 0–1)
BUN SERPL-MCNC: 34 MG/DL (ref 7–18)
CALCIUM SERPL-MCNC: 9 MG/DL (ref 8.5–10.1)
CHLORIDE SERPL-SCNC: 116 MMOL/L (ref 98–107)
CO2 SERPL-SCNC: 32 MMOL/L (ref 21–32)
CREAT SERPL-MCNC: 0.99 MG/DL (ref 0.55–1.02)
CRENATED CELLS: ABNORMAL
EOSINOPHIL # BLD AUTO: 0.07 X10E3/UL (ref 0–0.5)
EOSINOPHIL NFR BLD AUTO: 0.6 % (ref 1–4)
ERYTHROCYTE [DISTWIDTH] IN BLOOD BY AUTOMATED COUNT: 18.6 % (ref 11.5–14.5)
GLUCOSE SERPL-MCNC: 211 MG/DL (ref 74–106)
HCT VFR BLD AUTO: 38.1 % (ref 38–47)
HGB BLD-MCNC: 12.6 G/DL (ref 12–16)
IMM GRANULOCYTES # BLD AUTO: 0.1 X10E3/UL (ref 0–0.04)
IMM GRANULOCYTES NFR BLD: 0.9 % (ref 0–0.4)
LYMPHOCYTES # BLD AUTO: 0.79 X10E3/UL (ref 1–4.8)
LYMPHOCYTES NFR BLD AUTO: 7.2 % (ref 27–41)
MCH RBC QN AUTO: 23.8 PG (ref 27–31)
MCHC RBC AUTO-ENTMCNC: 33.1 G/DL (ref 32–36)
MCV RBC AUTO: 72 FL (ref 80–96)
MICROCYTES BLD QL SMEAR: ABNORMAL
MONOCYTES # BLD AUTO: 0.71 X10E3/UL (ref 0–0.8)
MONOCYTES NFR BLD AUTO: 6.5 % (ref 2–6)
MPC BLD CALC-MCNC: 10.8 FL (ref 9.4–12.4)
NEUTROPHILS # BLD AUTO: 9.32 X10E3/UL (ref 1.8–7.7)
NEUTROPHILS NFR BLD AUTO: 84.7 % (ref 53–65)
NRBC # BLD AUTO: 0 X10E3/UL (ref 0–0)
NRBC, AUTO (.00): 0 /100 (ref 0–0)
PLATELET # BLD AUTO: 320 X10E3/UL (ref 150–400)
POTASSIUM SERPL-SCNC: 4 MMOL/L (ref 3.5–5.1)
RBC # BLD AUTO: 5.29 X10E6/UL (ref 4.2–5.4)
SODIUM SERPL-SCNC: 155 MMOL/L (ref 136–145)
TARGETS BLD QL SMEAR: ABNORMAL
WBC # BLD AUTO: 11 X10E3/UL (ref 4.5–11)

## 2020-10-24 ENCOUNTER — HISTORICAL (OUTPATIENT)
Dept: ADMINISTRATIVE | Facility: HOSPITAL | Age: 85
End: 2020-10-24

## 2020-10-25 ENCOUNTER — HISTORICAL (OUTPATIENT)
Dept: ADMINISTRATIVE | Facility: HOSPITAL | Age: 85
End: 2020-10-25

## 2020-10-26 ENCOUNTER — HISTORICAL (OUTPATIENT)
Dept: ADMINISTRATIVE | Facility: HOSPITAL | Age: 85
End: 2020-10-26

## 2020-10-27 ENCOUNTER — HISTORICAL (OUTPATIENT)
Dept: ADMINISTRATIVE | Facility: HOSPITAL | Age: 85
End: 2020-10-27

## 2020-10-27 LAB
GLUCOSE SERPL-MCNC: 248 MG/DL (ref 70–105)
GLUCOSE SERPL-MCNC: 295 MG/DL (ref 70–105)

## 2020-10-28 ENCOUNTER — HISTORICAL (OUTPATIENT)
Dept: ADMINISTRATIVE | Facility: HOSPITAL | Age: 85
End: 2020-10-28

## 2020-10-28 LAB
ANION GAP SERPL CALCULATED.3IONS-SCNC: 7 MMOL/L
ANISOCYTOSIS BLD QL SMEAR: ABNORMAL
BASOPHILS # BLD AUTO: 0.01 X10E3/UL (ref 0–0.2)
BASOPHILS NFR BLD AUTO: 0.2 % (ref 0–1)
BUN SERPL-MCNC: 15 MG/DL (ref 7–18)
CALCIUM SERPL-MCNC: 7.9 MG/DL (ref 8.5–10.1)
CHLORIDE SERPL-SCNC: 104 MMOL/L (ref 98–107)
CO2 SERPL-SCNC: 33 MMOL/L (ref 21–32)
CREAT SERPL-MCNC: 0.65 MG/DL (ref 0.55–1.02)
EOSINOPHIL # BLD AUTO: 0.11 X10E3/UL (ref 0–0.5)
EOSINOPHIL NFR BLD AUTO: 1.8 % (ref 1–4)
ERYTHROCYTE [DISTWIDTH] IN BLOOD BY AUTOMATED COUNT: 18.9 % (ref 11.5–14.5)
GLUCOSE SERPL-MCNC: 278 MG/DL (ref 74–106)
GLUCOSE SERPL-MCNC: 296 MG/DL (ref 70–105)
HCT VFR BLD AUTO: 29.7 % (ref 38–47)
HGB BLD-MCNC: 10 G/DL (ref 12–16)
IMM GRANULOCYTES # BLD AUTO: 0.03 X10E3/UL (ref 0–0.04)
IMM GRANULOCYTES NFR BLD: 0.5 % (ref 0–0.4)
LYMPHOCYTES # BLD AUTO: 1.24 X10E3/UL (ref 1–4.8)
LYMPHOCYTES NFR BLD AUTO: 19.9 % (ref 27–41)
MAGNESIUM SERPL-MCNC: 1.8 MG/DL (ref 1.7–2.3)
MCH RBC QN AUTO: 23.5 PG (ref 27–31)
MCHC RBC AUTO-ENTMCNC: 33.7 G/DL (ref 32–36)
MCV RBC AUTO: 69.7 FL (ref 80–96)
MICROCYTES BLD QL SMEAR: ABNORMAL
MONOCYTES # BLD AUTO: 0.62 X10E3/UL (ref 0–0.8)
MONOCYTES NFR BLD AUTO: 9.9 % (ref 2–6)
MPC BLD CALC-MCNC: 11.4 FL (ref 9.4–12.4)
NEUTROPHILS # BLD AUTO: 4.23 X10E3/UL (ref 1.8–7.7)
NEUTROPHILS NFR BLD AUTO: 67.7 % (ref 53–65)
NRBC # BLD AUTO: 0 X10E3/UL (ref 0–0)
NRBC, AUTO (.00): 0 /100 (ref 0–0)
OVALOCYTES BLD QL SMEAR: ABNORMAL
PLATELET # BLD AUTO: 204 X10E3/UL (ref 150–400)
PLATELET MORPHOLOGY: NORMAL
POTASSIUM SERPL-SCNC: 3.8 MMOL/L (ref 3.5–5.1)
RBC # BLD AUTO: 4.26 X10E6/UL (ref 4.2–5.4)
SODIUM SERPL-SCNC: 140 MMOL/L (ref 136–145)
TARGETS BLD QL SMEAR: ABNORMAL
WBC # BLD AUTO: 6.24 X10E3/UL (ref 4.5–11)

## 2020-10-29 ENCOUNTER — HISTORICAL (OUTPATIENT)
Dept: ADMINISTRATIVE | Facility: HOSPITAL | Age: 85
End: 2020-10-29

## 2020-10-30 ENCOUNTER — HISTORICAL (OUTPATIENT)
Dept: ADMINISTRATIVE | Facility: HOSPITAL | Age: 85
End: 2020-10-30

## 2020-10-30 LAB
GLUCOSE SERPL-MCNC: 259 MG/DL (ref 70–105)
GLUCOSE SERPL-MCNC: 295 MG/DL (ref 70–105)

## 2020-10-31 ENCOUNTER — HISTORICAL (OUTPATIENT)
Dept: ADMINISTRATIVE | Facility: HOSPITAL | Age: 85
End: 2020-10-31

## 2020-10-31 LAB
GLUCOSE SERPL-MCNC: 252 MG/DL (ref 70–105)
GLUCOSE SERPL-MCNC: 253 MG/DL (ref 70–105)
GLUCOSE SERPL-MCNC: 290 MG/DL (ref 70–105)

## 2020-11-01 ENCOUNTER — HISTORICAL (OUTPATIENT)
Dept: ADMINISTRATIVE | Facility: HOSPITAL | Age: 85
End: 2020-11-01

## 2020-11-01 LAB
ALBUMIN SERPL BCP-MCNC: 2.1 G/DL (ref 3.5–5)
ALBUMIN/GLOB SERPL: 0.6 {RATIO}
ALP SERPL-CCNC: 127 U/L (ref 55–142)
ALT SERPL W P-5'-P-CCNC: 30 U/L (ref 13–56)
ANION GAP SERPL CALCULATED.3IONS-SCNC: 5 MMOL/L
ANISOCYTOSIS BLD QL SMEAR: ABNORMAL
AST SERPL W P-5'-P-CCNC: 22 U/L (ref 15–37)
BASOPHILS # BLD AUTO: 0.03 X10E3/UL (ref 0–0.2)
BASOPHILS NFR BLD AUTO: 0.5 % (ref 0–1)
BILIRUB SERPL-MCNC: 0.4 MG/DL (ref 0–1.2)
BUN SERPL-MCNC: 13 MG/DL (ref 7–18)
BUN/CREAT SERPL: 22.4
CALCIUM SERPL-MCNC: 8.7 MG/DL (ref 8.5–10.1)
CHLORIDE SERPL-SCNC: 103 MMOL/L (ref 98–107)
CO2 SERPL-SCNC: 35 MMOL/L (ref 21–32)
CREAT SERPL-MCNC: 0.58 MG/DL (ref 0.55–1.02)
EOSINOPHIL # BLD AUTO: 0.1 X10E3/UL (ref 0–0.5)
EOSINOPHIL NFR BLD AUTO: 1.7 % (ref 1–4)
ERYTHROCYTE [DISTWIDTH] IN BLOOD BY AUTOMATED COUNT: 20.3 % (ref 11.5–14.5)
GLOBULIN SER-MCNC: 3.8 G/DL (ref 2–4)
GLUCOSE SERPL-MCNC: 163 MG/DL (ref 70–105)
GLUCOSE SERPL-MCNC: 211 MG/DL (ref 70–105)
GLUCOSE SERPL-MCNC: 218 MG/DL (ref 74–106)
GLUCOSE SERPL-MCNC: 252 MG/DL (ref 70–105)
GLUCOSE SERPL-MCNC: 257 MG/DL (ref 70–105)
HCT VFR BLD AUTO: 29 % (ref 38–47)
HGB BLD-MCNC: 9.7 G/DL (ref 12–16)
IMM GRANULOCYTES # BLD AUTO: 0.02 X10E3/UL (ref 0–0.04)
IMM GRANULOCYTES NFR BLD: 0.3 % (ref 0–0.4)
LYMPHOCYTES # BLD AUTO: 1.04 X10E3/UL (ref 1–4.8)
LYMPHOCYTES NFR BLD AUTO: 17.8 % (ref 27–41)
MCH RBC QN AUTO: 23.5 PG (ref 27–31)
MCHC RBC AUTO-ENTMCNC: 33.4 G/DL (ref 32–36)
MCV RBC AUTO: 70.2 FL (ref 80–96)
MICROCYTES BLD QL SMEAR: ABNORMAL
MONOCYTES # BLD AUTO: 0.76 X10E3/UL (ref 0–0.8)
MONOCYTES NFR BLD AUTO: 13 % (ref 2–6)
MPC BLD CALC-MCNC: 10.4 FL (ref 9.4–12.4)
NEUTROPHILS # BLD AUTO: 3.89 X10E3/UL (ref 1.8–7.7)
NEUTROPHILS NFR BLD AUTO: 66.7 % (ref 53–65)
NRBC # BLD AUTO: 0 X10E3/UL (ref 0–0)
NRBC, AUTO (.00): 0 /100 (ref 0–0)
PLATELET # BLD AUTO: 215 X10E3/UL (ref 150–400)
PLATELET MORPHOLOGY: ABNORMAL
POLYCHROMASIA BLD QL SMEAR: ABNORMAL
POTASSIUM SERPL-SCNC: 4 MMOL/L (ref 3.5–5.1)
PROT SERPL-MCNC: 5.9 G/DL (ref 6.4–8.2)
RBC # BLD AUTO: 4.13 X10E6/UL (ref 4.2–5.4)
SODIUM SERPL-SCNC: 139 MMOL/L (ref 136–145)
TARGETS BLD QL SMEAR: ABNORMAL
WBC # BLD AUTO: 5.84 X10E3/UL (ref 4.5–11)

## 2020-11-02 ENCOUNTER — HISTORICAL (OUTPATIENT)
Dept: ADMINISTRATIVE | Facility: HOSPITAL | Age: 85
End: 2020-11-02

## 2020-11-02 LAB
ALBUMIN SERPL BCP-MCNC: 2.2 G/DL (ref 3.5–5)
ALBUMIN/GLOB SERPL: 0.6 {RATIO}
ALP SERPL-CCNC: 127 U/L (ref 55–142)
ALT SERPL W P-5'-P-CCNC: 28 U/L (ref 13–56)
ANION GAP SERPL CALCULATED.3IONS-SCNC: 10 MMOL/L
ANISOCYTOSIS BLD QL SMEAR: ABNORMAL
AST SERPL W P-5'-P-CCNC: 23 U/L (ref 15–37)
BASOPHILS # BLD AUTO: 0.03 X10E3/UL (ref 0–0.2)
BASOPHILS NFR BLD AUTO: 0.6 % (ref 0–1)
BILIRUB SERPL-MCNC: 0.5 MG/DL (ref 0–1.2)
BUN SERPL-MCNC: 11 MG/DL (ref 7–18)
BUN/CREAT SERPL: 18
CALCIUM SERPL-MCNC: 9.2 MG/DL (ref 8.5–10.1)
CHLORIDE SERPL-SCNC: 104 MMOL/L (ref 98–107)
CO2 SERPL-SCNC: 32 MMOL/L (ref 21–32)
CREAT SERPL-MCNC: 0.61 MG/DL (ref 0.55–1.02)
EOSINOPHIL # BLD AUTO: 0.1 X10E3/UL (ref 0–0.5)
EOSINOPHIL NFR BLD AUTO: 2 % (ref 1–4)
ERYTHROCYTE [DISTWIDTH] IN BLOOD BY AUTOMATED COUNT: 20.1 % (ref 11.5–14.5)
GLOBULIN SER-MCNC: 4 G/DL (ref 2–4)
GLUCOSE SERPL-MCNC: 159 MG/DL (ref 74–106)
GLUCOSE SERPL-MCNC: 168 MG/DL (ref 70–105)
HCT VFR BLD AUTO: 29.3 % (ref 38–47)
HGB BLD-MCNC: 10 G/DL (ref 12–16)
HYPOCHROMIA BLD QL SMEAR: SLIGHT
IMM GRANULOCYTES # BLD AUTO: 0.01 X10E3/UL (ref 0–0.04)
IMM GRANULOCYTES NFR BLD: 0.2 % (ref 0–0.4)
LYMPHOCYTES # BLD AUTO: 1.07 X10E3/UL (ref 1–4.8)
LYMPHOCYTES NFR BLD AUTO: 21 % (ref 27–41)
MCH RBC QN AUTO: 24.3 PG (ref 27–31)
MCHC RBC AUTO-ENTMCNC: 34.1 G/DL (ref 32–36)
MCV RBC AUTO: 71.3 FL (ref 80–96)
MICROCYTES BLD QL SMEAR: ABNORMAL
MONOCYTES # BLD AUTO: 0.58 X10E3/UL (ref 0–0.8)
MONOCYTES NFR BLD AUTO: 11.4 % (ref 2–6)
MPC BLD CALC-MCNC: 11 FL (ref 9.4–12.4)
NEUTROPHILS # BLD AUTO: 3.31 X10E3/UL (ref 1.8–7.7)
NEUTROPHILS NFR BLD AUTO: 64.8 % (ref 53–65)
NRBC # BLD AUTO: 0 X10E3/UL (ref 0–0)
NRBC, AUTO (.00): 0 /100 (ref 0–0)
OVALOCYTES BLD QL SMEAR: ABNORMAL
PLATELET # BLD AUTO: 232 X10E3/UL (ref 150–400)
PLATELET MORPHOLOGY: ABNORMAL
POLYCHROMASIA BLD QL SMEAR: ABNORMAL
POTASSIUM SERPL-SCNC: 3.9 MMOL/L (ref 3.5–5.1)
PROT SERPL-MCNC: 6.2 G/DL (ref 6.4–8.2)
RBC # BLD AUTO: 4.11 X10E6/UL (ref 4.2–5.4)
SODIUM SERPL-SCNC: 142 MMOL/L (ref 136–145)
TARGETS BLD QL SMEAR: ABNORMAL
WBC # BLD AUTO: 5.1 X10E3/UL (ref 4.5–11)

## 2020-11-03 ENCOUNTER — HISTORICAL (OUTPATIENT)
Dept: ADMINISTRATIVE | Facility: HOSPITAL | Age: 85
End: 2020-11-03

## 2020-11-03 LAB
ALBUMIN SERPL BCP-MCNC: 2.2 G/DL (ref 3.5–5)
ALBUMIN/GLOB SERPL: 0.6 {RATIO}
ALP SERPL-CCNC: 123 U/L (ref 55–142)
ALT SERPL W P-5'-P-CCNC: 25 U/L (ref 13–56)
ANION GAP SERPL CALCULATED.3IONS-SCNC: 7 MMOL/L
ANISOCYTOSIS BLD QL SMEAR: ABNORMAL
AST SERPL W P-5'-P-CCNC: 21 U/L (ref 15–37)
BASOPHILS # BLD AUTO: 0.03 X10E3/UL (ref 0–0.2)
BASOPHILS NFR BLD AUTO: 0.6 % (ref 0–1)
BILIRUB SERPL-MCNC: 0.7 MG/DL (ref 0–1.2)
BUN SERPL-MCNC: 12 MG/DL (ref 7–18)
BUN/CREAT SERPL: 19
CALCIUM SERPL-MCNC: 9.1 MG/DL (ref 8.5–10.1)
CHLORIDE SERPL-SCNC: 105 MMOL/L (ref 98–107)
CO2 SERPL-SCNC: 33 MMOL/L (ref 21–32)
CREAT SERPL-MCNC: 0.63 MG/DL (ref 0.55–1.02)
EOSINOPHIL # BLD AUTO: 0.12 X10E3/UL (ref 0–0.5)
EOSINOPHIL NFR BLD AUTO: 2.5 % (ref 1–4)
ERYTHROCYTE [DISTWIDTH] IN BLOOD BY AUTOMATED COUNT: 20.3 % (ref 11.5–14.5)
FOLDED CELLS: ABNORMAL
GLOBULIN SER-MCNC: 3.8 G/DL (ref 2–4)
GLUCOSE SERPL-MCNC: 117 MG/DL (ref 74–106)
HCT VFR BLD AUTO: 28.6 % (ref 38–47)
HGB BLD-MCNC: 9.7 G/DL (ref 12–16)
HYPOCHROMIA BLD QL SMEAR: SLIGHT
IMM GRANULOCYTES # BLD AUTO: 0.01 X10E3/UL (ref 0–0.04)
IMM GRANULOCYTES NFR BLD: 0.2 % (ref 0–0.4)
LYMPHOCYTES # BLD AUTO: 1.08 X10E3/UL (ref 1–4.8)
LYMPHOCYTES NFR BLD AUTO: 22.3 % (ref 27–41)
MCH RBC QN AUTO: 24.3 PG (ref 27–31)
MCHC RBC AUTO-ENTMCNC: 33.9 G/DL (ref 32–36)
MCV RBC AUTO: 71.7 FL (ref 80–96)
MICROCYTES BLD QL SMEAR: ABNORMAL
MONOCYTES # BLD AUTO: 0.61 X10E3/UL (ref 0–0.8)
MONOCYTES NFR BLD AUTO: 12.6 % (ref 2–6)
MPC BLD CALC-MCNC: 10.3 FL (ref 9.4–12.4)
NEUTROPHILS # BLD AUTO: 2.99 X10E3/UL (ref 1.8–7.7)
NEUTROPHILS NFR BLD AUTO: 61.8 % (ref 53–65)
NRBC # BLD AUTO: 0 X10E3/UL (ref 0–0)
NRBC, AUTO (.00): 0 /100 (ref 0–0)
OVALOCYTES BLD QL SMEAR: ABNORMAL
PLATELET # BLD AUTO: 220 X10E3/UL (ref 150–400)
PLATELET MORPHOLOGY: ABNORMAL
POLYCHROMASIA BLD QL SMEAR: ABNORMAL
POTASSIUM SERPL-SCNC: 3.4 MMOL/L (ref 3.5–5.1)
PREALB SERPL NEPH-MCNC: 12 MG/DL (ref 20–40)
PROT SERPL-MCNC: 6 G/DL (ref 6.4–8.2)
RBC # BLD AUTO: 3.99 X10E6/UL (ref 4.2–5.4)
SARS-COV-2 RNA AMPLIFICATION, QUAL: POSITIVE
SODIUM SERPL-SCNC: 142 MMOL/L (ref 136–145)
TARGETS BLD QL SMEAR: ABNORMAL
WBC # BLD AUTO: 4.84 X10E3/UL (ref 4.5–11)

## 2020-11-04 ENCOUNTER — HISTORICAL (OUTPATIENT)
Dept: ADMINISTRATIVE | Facility: HOSPITAL | Age: 85
End: 2020-11-04

## 2020-11-04 LAB
ALBUMIN SERPL BCP-MCNC: 2.1 G/DL (ref 3.5–5)
ALBUMIN/GLOB SERPL: 0.6 {RATIO}
ALP SERPL-CCNC: 127 U/L (ref 55–142)
ALT SERPL W P-5'-P-CCNC: 21 U/L (ref 13–56)
ANION GAP SERPL CALCULATED.3IONS-SCNC: 9 MMOL/L
ANISOCYTOSIS BLD QL SMEAR: ABNORMAL
AST SERPL W P-5'-P-CCNC: 19 U/L (ref 15–37)
BASOPHILS # BLD AUTO: 0.02 X10E3/UL (ref 0–0.2)
BASOPHILS NFR BLD AUTO: 0.5 % (ref 0–1)
BILIRUB SERPL-MCNC: 0.5 MG/DL (ref 0–1.2)
BUN SERPL-MCNC: 15 MG/DL (ref 7–18)
BUN/CREAT SERPL: 23.8
CALCIUM SERPL-MCNC: 8.7 MG/DL (ref 8.5–10.1)
CHLORIDE SERPL-SCNC: 102 MMOL/L (ref 98–107)
CO2 SERPL-SCNC: 33 MMOL/L (ref 21–32)
CREAT SERPL-MCNC: 0.63 MG/DL (ref 0.55–1.02)
EOSINOPHIL # BLD AUTO: 0.09 X10E3/UL (ref 0–0.5)
EOSINOPHIL NFR BLD AUTO: 2.1 % (ref 1–4)
ERYTHROCYTE [DISTWIDTH] IN BLOOD BY AUTOMATED COUNT: 20.1 % (ref 11.5–14.5)
GLOBULIN SER-MCNC: 3.6 G/DL (ref 2–4)
GLUCOSE SERPL-MCNC: 199 MG/DL (ref 74–106)
GLUCOSE SERPL-MCNC: 214 MG/DL (ref 70–105)
HCT VFR BLD AUTO: 26.6 % (ref 38–47)
HGB BLD-MCNC: 9.1 G/DL (ref 12–16)
HYPOCHROMIA BLD QL SMEAR: SLIGHT
IMM GRANULOCYTES # BLD AUTO: 0 X10E3/UL (ref 0–0.04)
IMM GRANULOCYTES NFR BLD: 0 % (ref 0–0.4)
LYMPHOCYTES # BLD AUTO: 0.89 X10E3/UL (ref 1–4.8)
LYMPHOCYTES NFR BLD AUTO: 21.2 % (ref 27–41)
MCH RBC QN AUTO: 24.1 PG (ref 27–31)
MCHC RBC AUTO-ENTMCNC: 34.2 G/DL (ref 32–36)
MCV RBC AUTO: 70.6 FL (ref 80–96)
MICROCYTES BLD QL SMEAR: ABNORMAL
MONOCYTES # BLD AUTO: 0.47 X10E3/UL (ref 0–0.8)
MONOCYTES NFR BLD AUTO: 11.2 % (ref 2–6)
MPC BLD CALC-MCNC: 10.2 FL (ref 9.4–12.4)
NEUTROPHILS # BLD AUTO: 2.73 X10E3/UL (ref 1.8–7.7)
NEUTROPHILS NFR BLD AUTO: 65 % (ref 53–65)
NRBC # BLD AUTO: 0 X10E3/UL (ref 0–0)
NRBC, AUTO (.00): 0 /100 (ref 0–0)
OVALOCYTES BLD QL SMEAR: ABNORMAL
PLATELET # BLD AUTO: 209 X10E3/UL (ref 150–400)
PLATELET MORPHOLOGY: ABNORMAL
POLYCHROMASIA BLD QL SMEAR: ABNORMAL
POTASSIUM SERPL-SCNC: 3.7 MMOL/L (ref 3.5–5.1)
PROT SERPL-MCNC: 5.7 G/DL (ref 6.4–8.2)
RBC # BLD AUTO: 3.77 X10E6/UL (ref 4.2–5.4)
SCHISTOCYTES BLD QL AUTO: ABNORMAL
SODIUM SERPL-SCNC: 140 MMOL/L (ref 136–145)
TARGETS BLD QL SMEAR: ABNORMAL
WBC # BLD AUTO: 4.2 X10E3/UL (ref 4.5–11)

## 2020-11-28 ENCOUNTER — HISTORICAL (OUTPATIENT)
Dept: ADMINISTRATIVE | Facility: HOSPITAL | Age: 85
End: 2020-11-28

## 2020-11-29 LAB — TSH SERPL DL<=0.005 MIU/L-ACNC: 0.94 UIU/ML (ref 0.36–3.74)

## 2020-12-07 ENCOUNTER — HISTORICAL (OUTPATIENT)
Dept: ADMINISTRATIVE | Facility: HOSPITAL | Age: 85
End: 2020-12-07

## 2020-12-08 LAB
EST. AVERAGE GLUCOSE BLD GHB EST-MCNC: 167 MG/DL
HBA1C MFR BLD HPLC: 7.6 %

## 2020-12-19 ENCOUNTER — HISTORICAL (OUTPATIENT)
Dept: ADMINISTRATIVE | Facility: HOSPITAL | Age: 85
End: 2020-12-19

## 2021-01-06 ENCOUNTER — HISTORICAL (OUTPATIENT)
Dept: ADMINISTRATIVE | Facility: HOSPITAL | Age: 86
End: 2021-01-06

## 2021-01-06 LAB
ALBUMIN SERPL BCP-MCNC: 3.4 G/DL (ref 3.4–5)
ALBUMIN/GLOB SERPL: 0.8 {RATIO}
ALP SERPL-CCNC: 120 U/L (ref 46–116)
ALT SERPL W P-5'-P-CCNC: 35 U/L (ref 14–63)
AMORPH PHOS CRY #/AREA URNS LPF: ABNORMAL /LPF
ANION GAP SERPL CALCULATED.3IONS-SCNC: 19 MMOL/L
AST SERPL W P-5'-P-CCNC: 35 U/L (ref 15–37)
BACTERIA #/AREA URNS HPF: ABNORMAL /HPF
BASOPHILS # BLD AUTO: 0.02 X10E3/UL (ref 0–0.2)
BASOPHILS NFR BLD AUTO: 0.2 % (ref 0–1)
BILIRUB SERPL-MCNC: 0.6 MG/DL (ref 0.2–1)
BILIRUB UR QL STRIP: NEGATIVE MG/DL
BUN SERPL-MCNC: 21 MG/DL (ref 7–17)
BUN/CREAT SERPL: 22.1
CALCIUM SERPL-MCNC: 10.4 MG/DL (ref 8.5–10.1)
CHLORIDE SERPL-SCNC: 101 MMOL/L (ref 98–107)
CLARITY UR: ABNORMAL
CLARITY UR: ABNORMAL
CO2 SERPL-SCNC: 26 MMOL/L (ref 21–32)
COLOR UR: YELLOW
COLOR UR: YELLOW
CREAT SERPL-MCNC: 0.95 MG/DL (ref 0.55–1.3)
D DIMER PPP FEU-MCNC: >20 UG/ML (ref 0–0.47)
EOSINOPHIL # BLD AUTO: 0 X10E3/UL (ref 0–0.5)
EOSINOPHIL NFR BLD AUTO: 0 % (ref 1–4)
ERYTHROCYTE [DISTWIDTH] IN BLOOD BY AUTOMATED COUNT: 21.5 % (ref 11.5–14.5)
FLUAV AG UPPER RESP QL IA.RAPID: NEGATIVE
FLUBV AG UPPER RESP QL IA.RAPID: NEGATIVE
GLOBULIN SER-MCNC: 4.5 G/DL
GLUCOSE SERPL-MCNC: 244 MG/DL (ref 74–106)
GLUCOSE UR STRIP-MCNC: NORMAL MG/DL
HCT VFR BLD AUTO: 34.6 % (ref 38–47)
HGB BLD-MCNC: 12.1 G/DL (ref 12–16)
KETONES UR STRIP-SCNC: NEGATIVE MG/DL
LEUKOCYTE ESTERASE UR QL STRIP: NEGATIVE LEU/UL
LYMPHOCYTES # BLD AUTO: 1.33 X10E3/UL (ref 1–4.8)
LYMPHOCYTES NFR BLD AUTO: 10 % (ref 27–41)
MAGNESIUM SERPL-MCNC: 2 MG/DL (ref 1.8–2.4)
MCH RBC QN AUTO: 24.2 PG (ref 27–31)
MCHC RBC AUTO-ENTMCNC: 35 G/DL (ref 32–36)
MCV RBC AUTO: 69 FL (ref 80–96)
MONOCYTES # BLD AUTO: 0.89 X10E3/UL (ref 0–0.8)
MONOCYTES NFR BLD AUTO: 6.7 % (ref 2–6)
MPC BLD CALC-MCNC: 10.1 FL (ref 9.4–12.4)
MUCOUS THREADS #/AREA URNS HPF: ABNORMAL /HPF
NEUTROPHILS # BLD AUTO: 11.02 X10E3/UL (ref 1.8–7.7)
NEUTROPHILS NFR BLD AUTO: 83.1 % (ref 53–65)
NITRITE UR QL STRIP: NEGATIVE MG/DL
PH UR STRIP: 5.5 PH UNITS (ref 5–8)
PLATELET # BLD AUTO: 289 X10E3/UL (ref 150–400)
POTASSIUM SERPL-SCNC: 3.5 MMOL/L (ref 3.5–5.1)
PROT SERPL-MCNC: 7.9 G/DL (ref 6.4–8.2)
PROT UR QL STRIP: >=300 MG/DL
RBC # BLD AUTO: 4.99 X10E6/UL (ref 4.2–5.4)
RBC # UR STRIP: ABNORMAL ERY/UL
RBC #/AREA URNS HPF: ABNORMAL /HPF (ref 0–3)
SARS-COV+SARS-COV-2 AG RESP QL IA.RAPID: NEGATIVE
SODIUM SERPL-SCNC: 142 MMOL/L (ref 136–145)
SP GR UR STRIP: >=1.03 (ref 1–1.03)
SQUAMOUS #/AREA URNS LPF: ABNORMAL /LPF
TROPONIN I SERPL-MCNC: 0.4 NG/ML (ref 0–0.06)
UROBILINOGEN UR STRIP-ACNC: 0.2
WBC # BLD AUTO: 13.26 X10E3/UL (ref 4.5–11)
WBC #/AREA URNS HPF: ABNORMAL /HPF (ref 0–5)

## 2021-01-07 ENCOUNTER — HISTORICAL (OUTPATIENT)
Dept: ADMINISTRATIVE | Facility: HOSPITAL | Age: 86
End: 2021-01-07

## 2021-01-07 LAB
ALBUMIN SERPL BCP-MCNC: 3.1 G/DL (ref 3.4–5)
ALBUMIN/GLOB SERPL: 0.7 {RATIO}
ALP SERPL-CCNC: 111 U/L (ref 46–116)
ALT SERPL W P-5'-P-CCNC: 30 U/L (ref 14–63)
ANION GAP SERPL CALCULATED.3IONS-SCNC: 12 MMOL/L
ANISOCYTOSIS BLD QL SMEAR: ABNORMAL
AST SERPL W P-5'-P-CCNC: 28 U/L (ref 15–37)
BASOPHILS # BLD AUTO: 0.04 X10E3/UL (ref 0–0.2)
BASOPHILS NFR BLD AUTO: 0.4 % (ref 0–1)
BILIRUB SERPL-MCNC: 0.6 MG/DL (ref 0.2–1)
BUN SERPL-MCNC: 19 MG/DL (ref 7–17)
BUN/CREAT SERPL: 19.6
CALCIUM SERPL-MCNC: 9.9 MG/DL (ref 8.5–10.1)
CHLORIDE SERPL-SCNC: 105 MMOL/L (ref 98–107)
CO2 SERPL-SCNC: 31 MMOL/L (ref 21–32)
CREAT SERPL-MCNC: 0.97 MG/DL (ref 0.55–1.3)
D DIMER PPP FEU-MCNC: >20 UG/ML (ref 0–0.47)
EOSINOPHIL # BLD AUTO: 0.01 X10E3/UL (ref 0–0.5)
EOSINOPHIL NFR BLD AUTO: 0.1 % (ref 1–4)
ERYTHROCYTE [DISTWIDTH] IN BLOOD BY AUTOMATED COUNT: 21.3 % (ref 11.5–14.5)
GLOBULIN SER-MCNC: 4.4 G/DL
GLUCOSE SERPL-MCNC: 134 MG/DL (ref 74–106)
HCT VFR BLD AUTO: 33.3 % (ref 38–47)
HGB BLD-MCNC: 11.4 G/DL (ref 12–16)
LYMPHOCYTES # BLD AUTO: 1.65 X10E3/UL (ref 1–4.8)
LYMPHOCYTES NFR BLD AUTO: 15.9 % (ref 27–41)
MCH RBC QN AUTO: 24.2 PG (ref 27–31)
MCHC RBC AUTO-ENTMCNC: 34.2 G/DL (ref 32–36)
MCV RBC AUTO: 71 FL (ref 80–96)
MICROCYTES BLD QL SMEAR: ABNORMAL
MONOCYTES # BLD AUTO: 0.84 X10E3/UL (ref 0–0.8)
MONOCYTES NFR BLD AUTO: 8.1 % (ref 2–6)
MPC BLD CALC-MCNC: 9.5 FL (ref 9.4–12.4)
NEUTROPHILS # BLD AUTO: 7.86 X10E3/UL (ref 1.8–7.7)
NEUTROPHILS NFR BLD AUTO: 75.5 % (ref 53–65)
NT-PROBNP SERPL-MCNC: 6635 PG/ML (ref 5–450)
PLATELET # BLD AUTO: 264 X10E3/UL (ref 150–400)
PLATELET MORPHOLOGY: NORMAL
POTASSIUM SERPL-SCNC: 3.8 MMOL/L (ref 3.5–5.1)
PROT SERPL-MCNC: 7.5 G/DL (ref 6.4–8.2)
RBC # BLD AUTO: 4.71 X10E6/UL (ref 4.2–5.4)
SODIUM SERPL-SCNC: 144 MMOL/L (ref 136–145)
TROPONIN I SERPL-MCNC: 1.08 NG/ML (ref 0–0.06)
TSH SERPL DL<=0.005 MIU/L-ACNC: 1.73 UIU/ML (ref 0.36–3.74)
WBC # BLD AUTO: 10.4 X10E3/UL (ref 4.5–11)

## 2021-01-08 ENCOUNTER — HISTORICAL (OUTPATIENT)
Dept: ADMINISTRATIVE | Facility: HOSPITAL | Age: 86
End: 2021-01-08

## 2021-01-08 LAB
ALBUMIN SERPL BCP-MCNC: 3 G/DL (ref 3.4–5)
ALBUMIN/GLOB SERPL: 0.8 {RATIO}
ALP SERPL-CCNC: 103 U/L (ref 46–116)
ALT SERPL W P-5'-P-CCNC: 33 U/L (ref 14–63)
ANION GAP SERPL CALCULATED.3IONS-SCNC: 10 MMOL/L
APTT PPP: 33.2 SECONDS (ref 25.2–37.3)
AST SERPL W P-5'-P-CCNC: 27 U/L (ref 15–37)
BASOPHILS # BLD AUTO: 0.04 X10E3/UL (ref 0–0.2)
BASOPHILS NFR BLD AUTO: 0.5 % (ref 0–1)
BILIRUB SERPL-MCNC: 0.7 MG/DL (ref 0.2–1)
BUN SERPL-MCNC: 17 MG/DL (ref 7–17)
BUN/CREAT SERPL: 18.9
CALCIUM SERPL-MCNC: 9.7 MG/DL (ref 8.5–10.1)
CHLORIDE SERPL-SCNC: 106 MMOL/L (ref 98–107)
CO2 SERPL-SCNC: 30 MMOL/L (ref 21–32)
CREAT SERPL-MCNC: 0.9 MG/DL (ref 0.55–1.3)
D DIMER PPP FEU-MCNC: 9.23 UG/ML (ref 0–0.47)
EOSINOPHIL # BLD AUTO: 0 X10E3/UL (ref 0–0.5)
EOSINOPHIL NFR BLD AUTO: 0 % (ref 1–4)
ERYTHROCYTE [DISTWIDTH] IN BLOOD BY AUTOMATED COUNT: 20.8 % (ref 11.5–14.5)
GLOBULIN SER-MCNC: 3.8 G/DL
GLUCOSE SERPL-MCNC: 148 MG/DL (ref 74–106)
HCT VFR BLD AUTO: 33.1 % (ref 38–47)
HGB BLD-MCNC: 11.3 G/DL (ref 12–16)
INR BLD: 1.2 (ref 0–3.4)
LYMPHOCYTES # BLD AUTO: 1.91 X10E3/UL (ref 1–4.8)
LYMPHOCYTES NFR BLD AUTO: 25.6 % (ref 27–41)
MCH RBC QN AUTO: 24.2 PG (ref 27–31)
MCHC RBC AUTO-ENTMCNC: 34.1 G/DL (ref 32–36)
MCV RBC AUTO: 71 FL (ref 80–96)
MICROCYTES BLD QL SMEAR: ABNORMAL
MONOCYTES # BLD AUTO: 0.87 X10E3/UL (ref 0–0.8)
MONOCYTES NFR BLD AUTO: 11.7 % (ref 2–6)
MPC BLD CALC-MCNC: 10.3 FL (ref 9.4–12.4)
NEUTROPHILS # BLD AUTO: 4.63 X10E3/UL (ref 1.8–7.7)
NEUTROPHILS NFR BLD AUTO: 62.2 % (ref 53–65)
NT-PROBNP SERPL-MCNC: 3496 PG/ML (ref 5–450)
PLATELET # BLD AUTO: 234 X10E3/UL (ref 150–400)
POTASSIUM SERPL-SCNC: 3.8 MMOL/L (ref 3.5–5.1)
PROT SERPL-MCNC: 6.8 G/DL (ref 6.4–8.2)
PROTHROMBIN TIME: 15.2 SECONDS (ref 11.7–14.7)
RBC # BLD AUTO: 4.67 X10E6/UL (ref 4.2–5.4)
SODIUM SERPL-SCNC: 142 MMOL/L (ref 136–145)
TARGETS BLD QL SMEAR: ABNORMAL
TROPONIN I SERPL-MCNC: 0.28 NG/ML (ref 0–0.06)
WBC # BLD AUTO: 7.45 X10E3/UL (ref 4.5–11)

## 2021-01-10 ENCOUNTER — HISTORICAL (OUTPATIENT)
Dept: ADMINISTRATIVE | Facility: HOSPITAL | Age: 86
End: 2021-01-10

## 2021-01-10 LAB
ALBUMIN SERPL BCP-MCNC: 2.7 G/DL (ref 3.4–5)
ALBUMIN/GLOB SERPL: 0.7 {RATIO}
ALP SERPL-CCNC: 96 U/L (ref 46–116)
ALT SERPL W P-5'-P-CCNC: 37 U/L (ref 14–63)
ANION GAP SERPL CALCULATED.3IONS-SCNC: 8 MMOL/L
ANISOCYTOSIS BLD QL SMEAR: ABNORMAL
AST SERPL W P-5'-P-CCNC: 27 U/L (ref 15–37)
BASOPHILS # BLD AUTO: 0.04 X10E3/UL (ref 0–0.2)
BASOPHILS NFR BLD AUTO: 0.7 % (ref 0–1)
BILIRUB SERPL-MCNC: 0.6 MG/DL (ref 0.2–1)
BUN SERPL-MCNC: 9 MG/DL (ref 7–17)
BUN/CREAT SERPL: 10.3
CALCIUM SERPL-MCNC: 9.1 MG/DL (ref 8.5–10.1)
CHLORIDE SERPL-SCNC: 107 MMOL/L (ref 98–107)
CO2 SERPL-SCNC: 31 MMOL/L (ref 21–32)
CREAT SERPL-MCNC: 0.87 MG/DL (ref 0.55–1.3)
D DIMER PPP FEU-MCNC: 2.31 UG/ML (ref 0–0.47)
EOSINOPHIL # BLD AUTO: 0 X10E3/UL (ref 0–0.5)
EOSINOPHIL NFR BLD AUTO: 0 % (ref 1–4)
ERYTHROCYTE [DISTWIDTH] IN BLOOD BY AUTOMATED COUNT: 20.2 % (ref 11.5–14.5)
GLOBULIN SER-MCNC: 4 G/DL
GLUCOSE SERPL-MCNC: 146 MG/DL (ref 74–106)
HCT VFR BLD AUTO: 32 % (ref 38–47)
HGB BLD-MCNC: 10.9 G/DL (ref 12–16)
LYMPHOCYTES # BLD AUTO: 1.61 X10E3/UL (ref 1–4.8)
LYMPHOCYTES NFR BLD AUTO: 27.7 % (ref 27–41)
MCH RBC QN AUTO: 24.3 PG (ref 27–31)
MCHC RBC AUTO-ENTMCNC: 34.1 G/DL (ref 32–36)
MCV RBC AUTO: 71 FL (ref 80–96)
MICROCYTES BLD QL SMEAR: ABNORMAL
MONOCYTES # BLD AUTO: 0.54 X10E3/UL (ref 0–0.8)
MONOCYTES NFR BLD AUTO: 9.3 % (ref 2–6)
MPC BLD CALC-MCNC: 10.6 FL (ref 9.4–12.4)
NEUTROPHILS # BLD AUTO: 3.62 X10E3/UL (ref 1.8–7.7)
NEUTROPHILS NFR BLD AUTO: 62.3 % (ref 53–65)
NT-PROBNP SERPL-MCNC: 750 PG/ML (ref 5–450)
PLATELET # BLD AUTO: 245 X10E3/UL (ref 150–400)
PLATELET MORPHOLOGY: NORMAL
POTASSIUM SERPL-SCNC: 3.4 MMOL/L (ref 3.5–5.1)
PROT SERPL-MCNC: 6.7 G/DL (ref 6.4–8.2)
RBC # BLD AUTO: 4.48 X10E6/UL (ref 4.2–5.4)
SODIUM SERPL-SCNC: 143 MMOL/L (ref 136–145)
TROPONIN I SERPL-MCNC: 0.07 NG/ML (ref 0–0.06)
WBC # BLD AUTO: 5.81 X10E3/UL (ref 4.5–11)

## 2021-01-11 ENCOUNTER — HISTORICAL (OUTPATIENT)
Dept: ADMINISTRATIVE | Facility: HOSPITAL | Age: 86
End: 2021-01-11

## 2021-01-11 LAB
ALBUMIN SERPL BCP-MCNC: 2.8 G/DL (ref 3.4–5)
ALBUMIN/GLOB SERPL: 0.7 {RATIO}
ALP SERPL-CCNC: 100 U/L (ref 46–116)
ALT SERPL W P-5'-P-CCNC: 44 U/L (ref 14–63)
ANION GAP SERPL CALCULATED.3IONS-SCNC: 13 MMOL/L
ANISOCYTOSIS BLD QL SMEAR: ABNORMAL
AST SERPL W P-5'-P-CCNC: 31 U/L (ref 15–37)
BASOPHILS # BLD AUTO: 0.04 X10E3/UL (ref 0–0.2)
BASOPHILS NFR BLD AUTO: 0.6 % (ref 0–1)
BILIRUB SERPL-MCNC: 0.6 MG/DL (ref 0.2–1)
BUN SERPL-MCNC: 9 MG/DL (ref 7–17)
BUN/CREAT SERPL: 10.3
CALCIUM SERPL-MCNC: 9.5 MG/DL (ref 8.5–10.1)
CHLORIDE SERPL-SCNC: 106 MMOL/L (ref 98–107)
CO2 SERPL-SCNC: 29 MMOL/L (ref 21–32)
CREAT SERPL-MCNC: 0.87 MG/DL (ref 0.55–1.3)
D DIMER PPP FEU-MCNC: 1.91 UG/ML (ref 0–0.47)
EOSINOPHIL # BLD AUTO: 0 X10E3/UL (ref 0–0.5)
EOSINOPHIL NFR BLD AUTO: 0 % (ref 1–4)
ERYTHROCYTE [DISTWIDTH] IN BLOOD BY AUTOMATED COUNT: 20.5 % (ref 11.5–14.5)
GLOBULIN SER-MCNC: 4 G/DL
GLUCOSE SERPL-MCNC: 116 MG/DL (ref 74–106)
HCT VFR BLD AUTO: 32.3 % (ref 38–47)
HGB BLD-MCNC: 10.9 G/DL (ref 12–16)
HYPOCHROMIA BLD QL SMEAR: ABNORMAL
LYMPHOCYTES # BLD AUTO: 2.19 X10E3/UL (ref 1–4.8)
LYMPHOCYTES NFR BLD AUTO: 33.4 % (ref 27–41)
MCH RBC QN AUTO: 24.1 PG (ref 27–31)
MCHC RBC AUTO-ENTMCNC: 33.7 G/DL (ref 32–36)
MCV RBC AUTO: 72 FL (ref 80–96)
MICROCYTES BLD QL SMEAR: ABNORMAL
MONOCYTES # BLD AUTO: 0.53 X10E3/UL (ref 0–0.8)
MONOCYTES NFR BLD AUTO: 8.1 % (ref 2–6)
MPC BLD CALC-MCNC: 10.3 FL (ref 9.4–12.4)
NEUTROPHILS # BLD AUTO: 3.79 X10E3/UL (ref 1.8–7.7)
NEUTROPHILS NFR BLD AUTO: 57.9 % (ref 53–65)
NT-PROBNP SERPL-MCNC: 629 PG/ML (ref 5–450)
PLATELET # BLD AUTO: 248 X10E3/UL (ref 150–400)
PLATELET MORPHOLOGY: NORMAL
POTASSIUM SERPL-SCNC: 3.8 MMOL/L (ref 3.5–5.1)
PROT SERPL-MCNC: 6.8 G/DL (ref 6.4–8.2)
RBC # BLD AUTO: 4.52 X10E6/UL (ref 4.2–5.4)
SODIUM SERPL-SCNC: 144 MMOL/L (ref 136–145)
TROPONIN I SERPL-MCNC: 0.07 NG/ML (ref 0–0.06)
WBC # BLD AUTO: 6.55 X10E3/UL (ref 4.5–11)

## 2021-01-18 ENCOUNTER — HISTORICAL (OUTPATIENT)
Dept: ADMINISTRATIVE | Facility: HOSPITAL | Age: 86
End: 2021-01-18

## 2021-01-18 LAB — MAGNESIUM SERPL-MCNC: 2.2 MG/DL (ref 1.8–2.4)

## 2021-01-25 ENCOUNTER — HISTORICAL (OUTPATIENT)
Dept: ADMINISTRATIVE | Facility: HOSPITAL | Age: 86
End: 2021-01-25

## 2021-01-25 LAB
FLUAV AG UPPER RESP QL IA.RAPID: NEGATIVE
FLUBV AG UPPER RESP QL IA.RAPID: NEGATIVE

## 2021-03-06 ENCOUNTER — HISTORICAL (OUTPATIENT)
Dept: ADMINISTRATIVE | Facility: HOSPITAL | Age: 86
End: 2021-03-06

## 2021-03-06 LAB — MAGNESIUM SERPL-MCNC: 2 MG/DL (ref 1.8–2.4)

## 2021-03-07 LAB
EST. AVERAGE GLUCOSE BLD GHB EST-MCNC: 130 MG/DL
HBA1C MFR BLD HPLC: 6.5 %
TSH SERPL DL<=0.005 MIU/L-ACNC: 1.66 UIU/ML (ref 0.36–3.74)

## 2021-05-17 ENCOUNTER — HOSPITAL ENCOUNTER (OUTPATIENT)
Dept: RADIOLOGY | Facility: HOSPITAL | Age: 86
Discharge: HOME | End: 2021-05-17
Attending: ORTHOPAEDIC SURGERY
Payer: MEDICARE

## 2021-05-17 DIAGNOSIS — M25.511 ACUTE PAIN OF RIGHT SHOULDER: ICD-10-CM

## 2021-05-17 PROBLEM — M19.011 ARTHRITIS OF RIGHT ACROMIOCLAVICULAR JOINT: Status: ACTIVE | Noted: 2021-05-17

## 2021-05-17 PROCEDURE — 73030 X-RAY EXAM OF SHOULDER: CPT | Mod: TC,RT

## 2021-06-24 ENCOUNTER — LAB REQUISITION (OUTPATIENT)
Dept: LAB | Facility: HOSPITAL | Age: 86
End: 2021-06-24
Attending: FAMILY MEDICINE
Payer: MEDICARE

## 2021-06-24 DIAGNOSIS — D64.9 ANEMIA, UNSPECIFIED: ICD-10-CM

## 2021-06-24 LAB
ANISOCYTOSIS BLD QL SMEAR: NORMAL
BASOPHILS # BLD AUTO: 0.04 K/UL (ref 0–0.2)
BASOPHILS NFR BLD AUTO: 0.5 % (ref 0–1)
DIFFERENTIAL METHOD BLD: ABNORMAL
EOSINOPHIL # BLD AUTO: 0 K/UL (ref 0–0.5)
EOSINOPHIL NFR BLD AUTO: 0 % (ref 1–4)
ERYTHROCYTE [DISTWIDTH] IN BLOOD BY AUTOMATED COUNT: 17.2 % (ref 11.5–14.5)
HCT VFR BLD AUTO: 35.1 % (ref 38–47)
HGB BLD-MCNC: 11.7 G/DL (ref 12–16)
HYPOCHROMIA BLD QL SMEAR: NORMAL
LYMPHOCYTES # BLD AUTO: 2.88 K/UL (ref 1–4.8)
LYMPHOCYTES NFR BLD AUTO: 37.9 % (ref 27–41)
MCH RBC QN AUTO: 23.2 PG (ref 27–31)
MCHC RBC AUTO-ENTMCNC: 33.3 G/DL (ref 32–36)
MCV RBC AUTO: 69.6 FL (ref 80–96)
MICROCYTES BLD QL SMEAR: NORMAL
MONOCYTES # BLD AUTO: 0.66 K/UL (ref 0–0.8)
MONOCYTES NFR BLD AUTO: 8.7 % (ref 2–6)
MPC BLD CALC-MCNC: 9.9 FL (ref 9.4–12.4)
NEUTROPHILS # BLD AUTO: 4.02 K/UL (ref 1.8–7.7)
NEUTROPHILS NFR BLD AUTO: 52.9 % (ref 53–65)
NRBC BLD MANUAL-RTO: NORMAL %
PLATELET # BLD AUTO: 307 K/UL (ref 150–400)
PLATELET MORPHOLOGY: NORMAL
RBC # BLD AUTO: 5.04 M/UL (ref 4.2–5.4)
ROULEAUX BLD QL SMEAR: NORMAL
WBC # BLD AUTO: 7.6 K/UL (ref 4.5–11)

## 2021-06-24 PROCEDURE — 85025 COMPLETE CBC W/AUTO DIFF WBC: CPT | Performed by: FAMILY MEDICINE

## 2021-09-15 ENCOUNTER — LAB REQUISITION (OUTPATIENT)
Dept: LAB | Facility: HOSPITAL | Age: 86
End: 2021-09-15
Attending: FAMILY MEDICINE
Payer: MEDICARE

## 2021-09-15 DIAGNOSIS — E11.9 TYPE 2 DIABETES MELLITUS WITHOUT COMPLICATIONS: ICD-10-CM

## 2021-09-15 DIAGNOSIS — E03.9 HYPOTHYROIDISM, UNSPECIFIED: ICD-10-CM

## 2021-09-15 DIAGNOSIS — R63.4 ABNORMAL WEIGHT LOSS: ICD-10-CM

## 2021-09-15 LAB
BASOPHILS # BLD AUTO: 0.04 K/UL (ref 0–0.2)
BASOPHILS NFR BLD AUTO: 0.8 % (ref 0–1)
DIFFERENTIAL METHOD BLD: ABNORMAL
EOSINOPHIL # BLD AUTO: 0 K/UL (ref 0–0.5)
EOSINOPHIL NFR BLD AUTO: 0 % (ref 1–4)
ERYTHROCYTE [DISTWIDTH] IN BLOOD BY AUTOMATED COUNT: 18 % (ref 11.5–14.5)
HCT VFR BLD AUTO: 33.8 % (ref 38–47)
HGB BLD-MCNC: 11.1 G/DL (ref 12–16)
LYMPHOCYTES # BLD AUTO: 1.55 K/UL (ref 1–4.8)
LYMPHOCYTES NFR BLD AUTO: 30.7 % (ref 27–41)
MCH RBC QN AUTO: 23.3 PG (ref 27–31)
MCHC RBC AUTO-ENTMCNC: 32.8 G/DL (ref 32–36)
MCV RBC AUTO: 71 FL (ref 80–96)
MONOCYTES # BLD AUTO: 0.42 K/UL (ref 0–0.8)
MONOCYTES NFR BLD AUTO: 8.3 % (ref 2–6)
MPC BLD CALC-MCNC: 10.3 FL (ref 9.4–12.4)
NEUTROPHILS # BLD AUTO: 3.04 K/UL (ref 1.8–7.7)
NEUTROPHILS NFR BLD AUTO: 60.2 % (ref 53–65)
PLATELET # BLD AUTO: 258 K/UL (ref 150–400)
RBC # BLD AUTO: 4.76 M/UL (ref 4.2–5.4)
TSH SERPL DL<=0.005 MIU/L-ACNC: 1.44 UIU/ML (ref 0.36–3.74)
WBC # BLD AUTO: 5.05 K/UL (ref 4.5–11)

## 2021-09-15 PROCEDURE — 83036 HEMOGLOBIN GLYCOSYLATED A1C: CPT | Performed by: FAMILY MEDICINE

## 2021-09-15 PROCEDURE — 85025 COMPLETE CBC W/AUTO DIFF WBC: CPT | Performed by: FAMILY MEDICINE

## 2021-09-15 PROCEDURE — 84443 ASSAY THYROID STIM HORMONE: CPT | Performed by: FAMILY MEDICINE

## 2021-09-16 LAB
EST. AVERAGE GLUCOSE BLD GHB EST-MCNC: 114 MG/DL
HBA1C MFR BLD HPLC: 6 % (ref 4.5–6.6)

## 2021-12-02 ENCOUNTER — LAB REQUISITION (OUTPATIENT)
Dept: LAB | Facility: HOSPITAL | Age: 86
End: 2021-12-02
Attending: EMERGENCY MEDICINE
Payer: MEDICARE

## 2021-12-02 DIAGNOSIS — E11.9 TYPE 2 DIABETES MELLITUS WITHOUT COMPLICATIONS: ICD-10-CM

## 2021-12-02 DIAGNOSIS — E53.9 VITAMIN B DEFICIENCY, UNSPECIFIED: ICD-10-CM

## 2021-12-02 DIAGNOSIS — E03.9 HYPOTHYROIDISM, UNSPECIFIED: ICD-10-CM

## 2021-12-02 DIAGNOSIS — Z79.899 OTHER LONG TERM (CURRENT) DRUG THERAPY: ICD-10-CM

## 2021-12-02 LAB
ANION GAP SERPL CALCULATED.3IONS-SCNC: 8 MMOL/L (ref 7–16)
BUN SERPL-MCNC: 9 MG/DL (ref 7–18)
BUN/CREAT SERPL: 10 (ref 6–20)
CALCIUM SERPL-MCNC: 8.8 MG/DL (ref 8.5–10.1)
CHLORIDE SERPL-SCNC: 105 MMOL/L (ref 98–107)
CO2 SERPL-SCNC: 32 MMOL/L (ref 21–32)
CREAT SERPL-MCNC: 0.88 MG/DL (ref 0.55–1.02)
EST. AVERAGE GLUCOSE BLD GHB EST-MCNC: 110 MG/DL
GLUCOSE SERPL-MCNC: 86 MG/DL (ref 74–106)
HBA1C MFR BLD HPLC: 5.9 % (ref 4.5–6.6)
POTASSIUM SERPL-SCNC: 3.7 MMOL/L (ref 3.5–5.1)
SODIUM SERPL-SCNC: 141 MMOL/L (ref 136–145)
TSH SERPL DL<=0.005 MIU/L-ACNC: 1.86 UIU/ML (ref 0.36–3.74)

## 2021-12-02 PROCEDURE — 83036 HEMOGLOBIN GLYCOSYLATED A1C: CPT | Performed by: FAMILY MEDICINE

## 2021-12-02 PROCEDURE — 84443 ASSAY THYROID STIM HORMONE: CPT | Performed by: FAMILY MEDICINE

## 2021-12-02 PROCEDURE — 80171 DRUG SCREEN QUANT GABAPENTIN: CPT | Performed by: FAMILY MEDICINE

## 2021-12-02 PROCEDURE — 80048 BASIC METABOLIC PNL TOTAL CA: CPT | Performed by: FAMILY MEDICINE

## 2021-12-06 LAB — GABAPENTIN SERPLBLD-MCNC: 4.6 ΜG/ML (ref 2–20)

## 2021-12-30 ENCOUNTER — LAB REQUISITION (OUTPATIENT)
Dept: LAB | Facility: HOSPITAL | Age: 86
End: 2021-12-30
Attending: FAMILY MEDICINE
Payer: MEDICARE

## 2021-12-30 DIAGNOSIS — R26.89 OTHER ABNORMALITIES OF GAIT AND MOBILITY: ICD-10-CM

## 2021-12-30 LAB
ALBUMIN SERPL BCP-MCNC: 3 G/DL (ref 3.5–5)
ALP SERPL-CCNC: 126 U/L (ref 55–142)
ALT SERPL W P-5'-P-CCNC: 15 U/L (ref 13–56)
AST SERPL W P-5'-P-CCNC: 15 U/L (ref 15–37)
BILIRUB DIRECT SERPL-MCNC: 0.1 MG/DL (ref 0–0.2)
BILIRUB SERPL-MCNC: 0.4 MG/DL (ref 0–1.2)
PROT SERPL-MCNC: 6.7 G/DL (ref 6.4–8.2)

## 2021-12-30 PROCEDURE — 80076 HEPATIC FUNCTION PANEL: CPT | Performed by: FAMILY MEDICINE

## 2022-01-17 PROCEDURE — 90853 GROUP PSYCHOTHERAPY: CPT

## 2022-01-18 PROCEDURE — 90853 GROUP PSYCHOTHERAPY: CPT

## 2022-01-20 PROCEDURE — 90853 GROUP PSYCHOTHERAPY: CPT

## 2022-01-27 PROCEDURE — 90853 GROUP PSYCHOTHERAPY: CPT

## 2022-01-31 PROCEDURE — 90853 GROUP PSYCHOTHERAPY: CPT

## 2022-02-01 PROCEDURE — 90853 GROUP PSYCHOTHERAPY: CPT

## 2022-02-03 PROCEDURE — 90853 GROUP PSYCHOTHERAPY: CPT

## 2022-02-07 PROCEDURE — 90853 GROUP PSYCHOTHERAPY: CPT

## 2022-02-08 PROCEDURE — 90853 GROUP PSYCHOTHERAPY: CPT

## 2022-02-10 PROCEDURE — 90853 GROUP PSYCHOTHERAPY: CPT

## 2022-02-12 PROCEDURE — 87070 CULTURE OTHR SPECIMN AEROBIC: CPT | Performed by: FAMILY MEDICINE

## 2022-02-15 ENCOUNTER — LAB REQUISITION (OUTPATIENT)
Dept: LAB | Facility: HOSPITAL | Age: 87
End: 2022-02-15
Payer: MEDICARE

## 2022-02-15 DIAGNOSIS — K94.22 GASTROSTOMY INFECTION: ICD-10-CM

## 2022-02-15 PROCEDURE — 90853 GROUP PSYCHOTHERAPY: CPT

## 2022-02-17 LAB — MICROORGANISM SPEC CULT: NORMAL

## 2022-02-17 PROCEDURE — 90853 GROUP PSYCHOTHERAPY: CPT

## 2022-02-22 PROCEDURE — 90853 GROUP PSYCHOTHERAPY: CPT

## 2022-03-01 PROCEDURE — 90853 GROUP PSYCHOTHERAPY: CPT

## 2022-03-03 PROCEDURE — 90853 GROUP PSYCHOTHERAPY: CPT

## 2022-03-07 PROCEDURE — 90853 GROUP PSYCHOTHERAPY: CPT

## 2022-03-07 PROCEDURE — 90832 PSYTX W PT 30 MINUTES: CPT

## 2022-03-08 PROCEDURE — 90853 GROUP PSYCHOTHERAPY: CPT

## 2022-03-10 ENCOUNTER — LAB REQUISITION (OUTPATIENT)
Dept: LAB | Facility: HOSPITAL | Age: 87
End: 2022-03-10
Attending: FAMILY MEDICINE
Payer: MEDICARE

## 2022-03-10 DIAGNOSIS — E11.9 TYPE 2 DIABETES MELLITUS WITHOUT COMPLICATIONS: ICD-10-CM

## 2022-03-10 DIAGNOSIS — E03.9 HYPOTHYROIDISM, UNSPECIFIED: ICD-10-CM

## 2022-03-10 DIAGNOSIS — Z79.899 OTHER LONG TERM (CURRENT) DRUG THERAPY: ICD-10-CM

## 2022-03-10 LAB
MAGNESIUM SERPL-MCNC: 2.4 MG/DL (ref 1.7–2.3)
TSH SERPL DL<=0.005 MIU/L-ACNC: 1.22 UIU/ML (ref 0.36–3.74)

## 2022-03-10 PROCEDURE — 90853 GROUP PSYCHOTHERAPY: CPT

## 2022-03-10 PROCEDURE — 84443 ASSAY THYROID STIM HORMONE: CPT | Performed by: FAMILY MEDICINE

## 2022-03-10 PROCEDURE — 83735 ASSAY OF MAGNESIUM: CPT | Performed by: FAMILY MEDICINE

## 2022-03-10 PROCEDURE — 83036 HEMOGLOBIN GLYCOSYLATED A1C: CPT | Performed by: FAMILY MEDICINE

## 2022-03-11 LAB
EST. AVERAGE GLUCOSE BLD GHB EST-MCNC: 117 MG/DL
HBA1C MFR BLD HPLC: 6.1 % (ref 4.5–6.6)

## 2022-03-24 PROCEDURE — 90853 GROUP PSYCHOTHERAPY: CPT

## 2022-03-25 PROCEDURE — 90853 GROUP PSYCHOTHERAPY: CPT

## 2022-03-26 PROCEDURE — 87086 URINE CULTURE/COLONY COUNT: CPT | Performed by: FAMILY MEDICINE

## 2022-03-26 PROCEDURE — 81003 URINALYSIS AUTO W/O SCOPE: CPT | Performed by: FAMILY MEDICINE

## 2022-03-27 ENCOUNTER — LAB REQUISITION (OUTPATIENT)
Dept: LAB | Facility: HOSPITAL | Age: 87
End: 2022-03-27
Attending: FAMILY MEDICINE
Payer: MEDICARE

## 2022-03-27 DIAGNOSIS — N39.0 URINARY TRACT INFECTION, SITE NOT SPECIFIED: ICD-10-CM

## 2022-03-27 LAB
BILIRUB UR QL STRIP: NEGATIVE
CLARITY UR: ABNORMAL
COLOR UR: YELLOW
GLUCOSE UR STRIP-MCNC: NEGATIVE MG/DL
KETONES UR STRIP-SCNC: NEGATIVE MG/DL
LEUKOCYTE ESTERASE UR QL STRIP: NEGATIVE
NITRITE UR QL STRIP: NEGATIVE
PH UR STRIP: 6 PH UNITS
PROT UR QL STRIP: NEGATIVE
RBC # UR STRIP: NEGATIVE /UL
SP GR UR STRIP: 1.02
UROBILINOGEN UR STRIP-ACNC: 0.2 MG/DL

## 2022-03-28 PROCEDURE — 90853 GROUP PSYCHOTHERAPY: CPT

## 2022-03-30 LAB — UA COMPLETE W REFLEX CULTURE PNL UR: NO GROWTH

## 2022-03-31 PROCEDURE — 90853 GROUP PSYCHOTHERAPY: CPT

## 2022-04-06 PROCEDURE — 90853 GROUP PSYCHOTHERAPY: CPT

## 2022-04-07 PROCEDURE — 90853 GROUP PSYCHOTHERAPY: CPT

## 2022-04-11 ENCOUNTER — HOSPITAL ENCOUNTER (EMERGENCY)
Facility: HOSPITAL | Age: 87
Discharge: HOME | End: 2022-04-11
Payer: MEDICARE

## 2022-04-11 VITALS
RESPIRATION RATE: 13 BRPM | HEART RATE: 85 BPM | WEIGHT: 189 LBS | SYSTOLIC BLOOD PRESSURE: 123 MMHG | DIASTOLIC BLOOD PRESSURE: 70 MMHG | HEIGHT: 67 IN | TEMPERATURE: 98 F | OXYGEN SATURATION: 97 % | BODY MASS INDEX: 29.66 KG/M2

## 2022-04-11 DIAGNOSIS — J98.4 LUNG DENSITY ON X-RAY: ICD-10-CM

## 2022-04-11 DIAGNOSIS — R00.2 PALPITATIONS: Primary | ICD-10-CM

## 2022-04-11 LAB
ANION GAP SERPL CALCULATED.3IONS-SCNC: 15 MMOL/L (ref 7–16)
ANISOCYTOSIS BLD QL SMEAR: NORMAL
APTT PPP: 26.9 SECONDS (ref 25.2–37.3)
BASOPHILS # BLD AUTO: 0.06 K/UL (ref 0–0.2)
BASOPHILS NFR BLD AUTO: 1 % (ref 0–1)
BILIRUB UR QL STRIP: NEGATIVE
BUN SERPL-MCNC: 13 MG/DL (ref 7–18)
BUN/CREAT SERPL: 14 (ref 6–20)
CALCIUM SERPL-MCNC: 9.5 MG/DL (ref 8.5–10.1)
CHLORIDE SERPL-SCNC: 103 MMOL/L (ref 98–107)
CK MB SERPL-MCNC: <1 NG/ML (ref 1–3.6)
CLARITY UR: CLEAR
CO2 SERPL-SCNC: 30 MMOL/L (ref 21–32)
COLOR UR: NORMAL
CREAT SERPL-MCNC: 0.92 MG/DL (ref 0.55–1.02)
DIFFERENTIAL METHOD BLD: ABNORMAL
EOSINOPHIL # BLD AUTO: 0.07 K/UL (ref 0–0.5)
EOSINOPHIL NFR BLD AUTO: 1.2 % (ref 1–4)
ERYTHROCYTE [DISTWIDTH] IN BLOOD BY AUTOMATED COUNT: 17.8 % (ref 11.5–14.5)
GLUCOSE SERPL-MCNC: 154 MG/DL (ref 74–106)
GLUCOSE UR STRIP-MCNC: NEGATIVE MG/DL
HCT VFR BLD AUTO: 39.3 % (ref 38–47)
HGB BLD-MCNC: 12.8 G/DL (ref 12–16)
IMM GRANULOCYTES # BLD AUTO: 0.01 K/UL (ref 0–0.04)
IMM GRANULOCYTES NFR BLD: 0.2 % (ref 0–0.4)
INR BLD: 0.92 (ref 0.9–1.1)
KETONES UR STRIP-SCNC: NEGATIVE MG/DL
LEUKOCYTE ESTERASE UR QL STRIP: NEGATIVE
LYMPHOCYTES # BLD AUTO: 1.91 K/UL (ref 1–4.8)
LYMPHOCYTES NFR BLD AUTO: 32.6 % (ref 27–41)
MAGNESIUM SERPL-MCNC: 2.1 MG/DL (ref 1.7–2.3)
MCH RBC QN AUTO: 23.1 PG (ref 27–31)
MCHC RBC AUTO-ENTMCNC: 32.6 G/DL (ref 32–36)
MCV RBC AUTO: 71.1 FL (ref 80–96)
MICROCYTES BLD QL SMEAR: NORMAL
MONOCYTES # BLD AUTO: 0.41 K/UL (ref 0–0.8)
MONOCYTES NFR BLD AUTO: 7 % (ref 2–6)
MPC BLD CALC-MCNC: 9.3 FL (ref 9.4–12.4)
MYOGLOBIN SERPL-MCNC: 40 NG/ML (ref 13–71)
NEUTROPHILS # BLD AUTO: 3.39 K/UL (ref 1.8–7.7)
NEUTROPHILS NFR BLD AUTO: 58 % (ref 53–65)
NITRITE UR QL STRIP: NEGATIVE
NRBC # BLD AUTO: 0 X10E3/UL
NRBC, AUTO (.00): 0 %
PH UR STRIP: 6 PH UNITS
PLATELET # BLD AUTO: 273 K/UL (ref 150–400)
PLATELET MORPHOLOGY: NORMAL
POTASSIUM SERPL-SCNC: 4 MMOL/L (ref 3.5–5.1)
PROT UR QL STRIP: NEGATIVE
PROTHROMBIN TIME: 12.4 SECONDS (ref 11.7–14.7)
RBC # BLD AUTO: 5.53 M/UL (ref 4.2–5.4)
RBC # UR STRIP: NEGATIVE /UL
SODIUM SERPL-SCNC: 144 MMOL/L (ref 136–145)
SP GR UR STRIP: <=1.005
TARGETS BLD QL SMEAR: NORMAL
TROPONIN I SERPL HS-MCNC: 9.4 PG/ML
TROPONIN I SERPL HS-MCNC: 9.8 PG/ML
TSH SERPL DL<=0.005 MIU/L-ACNC: 2.7 UIU/ML (ref 0.36–3.74)
UROBILINOGEN UR STRIP-ACNC: 0.2 MG/DL
WBC # BLD AUTO: 5.85 K/UL (ref 4.5–11)

## 2022-04-11 PROCEDURE — 93005 ELECTROCARDIOGRAM TRACING: CPT

## 2022-04-11 PROCEDURE — 82553 CREATINE MB FRACTION: CPT | Performed by: NURSE PRACTITIONER

## 2022-04-11 PROCEDURE — 84484 ASSAY OF TROPONIN QUANT: CPT | Performed by: NURSE PRACTITIONER

## 2022-04-11 PROCEDURE — 83735 ASSAY OF MAGNESIUM: CPT | Performed by: NURSE PRACTITIONER

## 2022-04-11 PROCEDURE — 36415 COLL VENOUS BLD VENIPUNCTURE: CPT | Performed by: NURSE PRACTITIONER

## 2022-04-11 PROCEDURE — 85610 PROTHROMBIN TIME: CPT | Performed by: NURSE PRACTITIONER

## 2022-04-11 PROCEDURE — 83874 ASSAY OF MYOGLOBIN: CPT | Performed by: NURSE PRACTITIONER

## 2022-04-11 PROCEDURE — 99283 PR EMERGENCY DEPT VISIT,LEVEL III: ICD-10-PCS | Mod: GW,ICN,, | Performed by: NURSE PRACTITIONER

## 2022-04-11 PROCEDURE — 99283 EMERGENCY DEPT VISIT LOW MDM: CPT | Mod: GW,ICN,, | Performed by: NURSE PRACTITIONER

## 2022-04-11 PROCEDURE — 85025 COMPLETE CBC W/AUTO DIFF WBC: CPT | Performed by: NURSE PRACTITIONER

## 2022-04-11 PROCEDURE — 80048 BASIC METABOLIC PNL TOTAL CA: CPT | Performed by: NURSE PRACTITIONER

## 2022-04-11 PROCEDURE — 85730 THROMBOPLASTIN TIME PARTIAL: CPT | Performed by: NURSE PRACTITIONER

## 2022-04-11 PROCEDURE — 99285 EMERGENCY DEPT VISIT HI MDM: CPT | Mod: 25

## 2022-04-11 PROCEDURE — 25500020 PHARM REV CODE 255: Performed by: NURSE PRACTITIONER

## 2022-04-11 PROCEDURE — 84443 ASSAY THYROID STIM HORMONE: CPT | Performed by: NURSE PRACTITIONER

## 2022-04-11 PROCEDURE — 93010 EKG 12-LEAD: ICD-10-PCS | Mod: GW,ICN,, | Performed by: HOSPITALIST

## 2022-04-11 PROCEDURE — 93010 ELECTROCARDIOGRAM REPORT: CPT | Mod: GW,ICN,, | Performed by: HOSPITALIST

## 2022-04-11 PROCEDURE — 81003 URINALYSIS AUTO W/O SCOPE: CPT | Performed by: NURSE PRACTITIONER

## 2022-04-11 RX ADMIN — IOPAMIDOL 100 ML: 755 INJECTION, SOLUTION INTRAVENOUS at 01:04

## 2022-04-11 NOTE — ED TRIAGE NOTES
Patient presents with SOB, CP x1 month and just not feeling well. Started while [facility] the [facility].

## 2022-04-11 NOTE — DISCHARGE INSTRUCTIONS
Follow up with pulmonology and cardiology; you should be contacted with appointments. Follow up with your primary care provider in 2 days. Return to the emergency department for any increase in symptoms or for any other new or worrisome symptoms.

## 2022-04-11 NOTE — ED PROVIDER NOTES
Encounter Date: 4/11/2022       History     Chief Complaint   Patient presents with    Shortness of Breath     86 year old female presents to the emergency department by EMS from an [facility]. She was at [facility], said she began feeling lightheaded, felt like her heart was pounding, had chest tightness, shortness of breath and nausea. The nurse at [facility] checked her blood pressure, and it was 76 systolic. When EMS arrived, her blood pressure was 113 systolic. She has had several episodes like this over the last month; she said she thinks she has had around 6 episodes since she began having them 1 month ago. Denies any complaints at this time. Denies any fever, cough, nausea, vomiting, diarrhea, abdominal pain, dysuria, known sick contacts.     The history is provided by the patient.   Palpitations   This is a recurrent problem. Associated symptoms include nausea, dizziness and shortness of breath. Pertinent negatives include no diaphoresis, no fever, no malaise/fatigue, no numbness, no chest pressure, no claudication, no exertional chest pressure, no irregular heartbeat, no orthopnea, no PND, no syncope, no abdominal pain, no vomiting, no headaches, no back pain, no leg pain, no lower extremity edema, no weakness, no cough, no hemoptysis and no sputum production.     Review of patient's allergies indicates:   Allergen Reactions    Darvon [propoxyphene]     Aspirin Nausea And Vomiting    Morphine Nausea Only     Past Medical History:   Diagnosis Date    Anemia     Diabetes mellitus, type 2     Hypertension     Tachycardia     Thyroid disease      Past Surgical History:   Procedure Laterality Date    SALPINGECTOMY       Family History   Problem Relation Age of Onset    No Known Problems Mother     No Known Problems Father     No Known Problems Sister     No Known Problems Brother     No Known Problems Maternal Aunt     No Known Problems Maternal Uncle     No Known Problems Paternal Aunt      No Known Problems Paternal Uncle     No Known Problems Maternal Grandmother     No Known Problems Maternal Grandfather     No Known Problems Paternal Grandmother     No Known Problems Paternal Grandfather      Social History     Tobacco Use    Smoking status: Never Smoker    Smokeless tobacco: Never Used   Substance Use Topics    Alcohol use: Never     Review of Systems   Constitutional: Negative for chills, diaphoresis, fever and malaise/fatigue.   Respiratory: Positive for shortness of breath. Negative for cough, hemoptysis and sputum production.    Cardiovascular: Positive for palpitations. Negative for orthopnea, claudication, syncope and PND.   Gastrointestinal: Positive for nausea. Negative for abdominal pain, diarrhea and vomiting.   Musculoskeletal: Negative for back pain.   Neurological: Positive for dizziness. Negative for weakness, numbness and headaches.   All other systems reviewed and are negative.      Physical Exam     Initial Vitals [04/11/22 1122]   BP Pulse Resp Temp SpO2   (!) 141/82 86 18 97.8 °F (36.6 °C) 96 %      MAP       --         Physical Exam    Vitals reviewed.  Constitutional: She appears well-developed and well-nourished.   Eyes: Pupils are equal, round, and reactive to light.   Neck: Neck supple.   Cardiovascular: Normal rate and regular rhythm.   Pulmonary/Chest: Breath sounds normal.   Abdominal: Abdomen is soft. Bowel sounds are normal.   Peg tube to abdomen dry and intact without any redness, swelling, drainage.   Musculoskeletal:         General: No tenderness or edema. Normal range of motion.      Cervical back: Neck supple.     Neurological: She is alert and oriented to person, place, and time. She has normal strength. GCS score is 15. GCS eye subscore is 4. GCS verbal subscore is 5. GCS motor subscore is 6.   Skin: Skin is warm and dry. Capillary refill takes less than 2 seconds.   Psychiatric: She has a normal mood and affect.         Medical Screening Exam   See Full  Note    ED Course   Procedures  Labs Reviewed   BASIC METABOLIC PANEL - Abnormal; Notable for the following components:       Result Value    Glucose 154 (*)     All other components within normal limits   CK-MB - Abnormal; Notable for the following components:    CK-MB <1.0 (*)     All other components within normal limits   CBC WITH DIFFERENTIAL - Abnormal; Notable for the following components:    RBC 5.53 (*)     MCV 71.1 (*)     MCH 23.1 (*)     RDW 17.8 (*)     MPV 9.3 (*)     Monocytes % 7.0 (*)     All other components within normal limits   TROPONIN I - Normal   MYOGLOBIN, SERUM - Normal   PROTIME-INR - Normal   APTT - Normal   MAGNESIUM - Normal   URINALYSIS - Normal   TSH - Normal   TROPONIN I - Normal   CBC W/ AUTO DIFFERENTIAL    Narrative:     The following orders were created for panel order CBC Auto Differential.  Procedure                               Abnormality         Status                     ---------                               -----------         ------                     CBC with Differential[[id number]]        Abnormal            Final result                 Please view results for these tests on the individual orders.   CBC MORPHOLOGY        ECG Results          EKG 12-lead (In process)  Result time 04/11/22 12:22:46    In process by Interface, Lab In [facility] (04/11/22 12:22:46)                 Narrative:    Test Reason : R07.9,    Vent. Rate : 085 BPM     Atrial Rate : 000 BPM     P-R Int : 264 ms          QRS Dur : 074 ms      QT Int : 370 ms       P-R-T Axes : 075 063 046 degrees     QTc Int : 414 ms    Sinus rhythm  with 1st degree A-V block  Anterior T wave abnormality  is nonspecific  Abnormal ECG      Referred By:             Confirmed By:                             Imaging Results          CTA Chest Non-Coronary (PE Study) (Final result)  Result time 04/11/22 13:29:21    Final result by Alexis Dennis MD (04/11/22 13:29:21)                 Impression:      No evidence of  acute pulmonary embolic disease    38 x 19 x 16 mm area of confluent density in the posterior segment of the right upper lobe which could represent pneumonia or neoplasm.  At the minimum, recommend continued radiographic follow-up until resolution.  Consider further evaluation with bronchoscopy      Electronically signed by: Alexis Dennis  Date:    04/11/2022  Time:    13:29             Narrative:    EXAMINATION:  CTA CHEST NON CORONARY    CLINICAL HISTORY:  shortness of breath, possible lung mass;.    COMPARISON:  October 6, 2021 CT chest    TECHNIQUE:  Thin spiral CT sections were obtained through the chest during the dynamic IV administration of 100 ml of Isovue 370.  In addition to multiplanar reconstruction images, 3-D images were also generated, archived, and analyzed.    The CT examination was performed using one or more of the following dose reduction techniques: Automated exposure control, adjustment of the mA and kV according to patient's size, use of acute or iterative reconstruction techniques.    FINDINGS:  There is no discrete filling defect within the pulmonary arterial tree to specifically suggest acute pulmonary embolic disease.  There is no thoracic aorta aneurysm or dissection.    There is at least mild coronary artery calcification involving the left anterior descending coronary artery.    There is no mediastinal mass or mediastinal lymphadenopathy.  There is no pleural or pericardial effusion.    Central airway is clear.  There is a confluent area of pleural based density in the right upper lobe posteriorly and laterally which measures 38 x 19 by 16 mm maximum dimensions.  This could represent pneumonia or neoplasm.  This is in the posterior segment of the right upper lobe.    There is mild dependent atelectasis in the lower lungs.    There is no definite acute process in the partially visualize upper abdomen.  PEG tube is in place.    There is scattered mild to moderate thoracic  spondylosis                               X-Ray Chest 1 View (Final result)  Result time 04/11/22 12:11:29    Final result by Gadiel Hamlin II, MD (04/11/22 12:11:29)                 Impression:      Increased right upper lung density could indicate infiltrate or mass, recommend CT to further evaluate this area      Electronically signed by: Gadiel Hamlin  Date:    04/11/2022  Time:    12:11             Narrative:    EXAMINATION:  XR CHEST 1 VIEW    CLINICAL HISTORY:  Chest pain, unspecified    COMPARISON:  8 January 2021    FINDINGS:  The heart and mediastinum are normal in size and configuration.  The pulmonary vascularity is normal in caliber.  There is increased right upper lung density.  No other lung infiltrates, effusions, pneumothorax or other abnormality is demonstrated.                                 Medications   iopamidoL (ISOVUE-370) injection 100 mL (100 mLs Intravenous Given 4/11/22 1310)                       Clinical Impression:   Final diagnoses:  [J98.4] Lung density on x-ray  [R00.2] Palpitations (Primary)          ED Disposition Condition    Discharge Stable        ED Prescriptions     None        Follow-up Information    None          Ewelina Singh, LUKE  04/11/22 [id number]

## 2022-04-11 NOTE — PROVIDER PROGRESS NOTES - EMERGENCY DEPT.
Encounter Date: 4/11/2022    ED Physician Progress Notes        I discussed results with the patient, patient's nephew, niece, and with his nurse, Samira Gama RN, at [place]; voiced understanding and agree with plan of care

## 2022-04-12 ENCOUNTER — TELEPHONE (OUTPATIENT)
Dept: EMERGENCY MEDICINE | Facility: HOSPITAL | Age: 87
End: 2022-04-12
Payer: MEDICARE

## 2022-04-12 NOTE — ED NOTES
04/12/2022 @ 1248 Samira Gama RN at [facility] notified of pts appt fo see Dr. Linares on May 24, 2022 @ 3800. Advised that this is first available that they have.

## 2022-04-12 NOTE — ED NOTES
04/12/2022 @ 1237 Spoke with PURA Mcdonald RN at Dr. Linares's office appt rec'd for pt for first available which is May 24, 2022 @ 3065. (145pm). LUKE Tinsley notified of appt. Will call [facility] where pt is a resident to let them know of appt date & time.

## 2022-04-18 ENCOUNTER — TELEPHONE (OUTPATIENT)
Dept: PULMONOLOGY | Facility: CLINIC | Age: 87
End: 2022-04-18
Payer: MEDICARE

## 2022-04-18 DIAGNOSIS — I26.99 PULMONARY EMBOLISM, UNSPECIFIED CHRONICITY, UNSPECIFIED PULMONARY EMBOLISM TYPE, UNSPECIFIED WHETHER ACUTE COR PULMONALE PRESENT: ICD-10-CM

## 2022-04-18 DIAGNOSIS — U07.1 COVID: ICD-10-CM

## 2022-04-18 DIAGNOSIS — R93.89 ABNORMAL CT OF THE CHEST: Primary | ICD-10-CM

## 2022-04-18 RX ORDER — TEMAZEPAM 30 MG/1
1 CAPSULE ORAL NIGHTLY
COMMUNITY
Start: 2022-02-28 | End: 2022-06-14 | Stop reason: CLARIF

## 2022-04-18 RX ORDER — ONDANSETRON 4 MG/1
TABLET, FILM COATED ORAL
COMMUNITY
Start: 2022-02-21

## 2022-04-18 RX ORDER — GABAPENTIN 600 MG/1
600 TABLET ORAL 2 TIMES DAILY
COMMUNITY
Start: 2022-03-19

## 2022-04-18 NOTE — TELEPHONE ENCOUNTER
"Samira  [phone]: cell  [phone].  Nursing Supervisor   at [facility], [place].    Call received this Am from Samira, Nurse.  She requested that pt's 5/24/22 Consult appointment   be moved:  Pt requested appointment "sooner".   reported that  felt "One month was too long a wait for abnormal Ct Chest"    Chart review reflected that appt was originally  Scheduled for 5/24/22 but rescheduled for 4/29/22 with .  ( I recall phone call last week to move to earlier appt). April 29 appt date/time had been phoned to Eveline at [facility].     confirmed that pt no longer uses Oxygen,   has no SOB, and has acceptable O2Sats with daily activity, has no cough or fever.     Current med list was reviewed and confirmed with aSmira:   is not on Neb tx's or any respiratory meds currently.   She did confirm that pt was very ill in the past with Covid but doing "better' now.   She confirmed Pt was wanting to be seen when CT chest report was reviewed with her post ER visit 4/11/22.    Samira was encouraged to consult , ' PCP should she have any changes prior to 4/29/22 appointment.//gp  "

## 2022-04-21 PROCEDURE — 90832 PSYTX W PT 30 MINUTES: CPT

## 2022-04-21 PROCEDURE — 90853 GROUP PSYCHOTHERAPY: CPT

## 2022-04-25 PROCEDURE — 90853 GROUP PSYCHOTHERAPY: CPT

## 2022-04-26 PROCEDURE — 90853 GROUP PSYCHOTHERAPY: CPT

## 2022-04-28 PROCEDURE — 90853 GROUP PSYCHOTHERAPY: CPT

## 2022-04-29 ENCOUNTER — HOSPITAL ENCOUNTER (OUTPATIENT)
Dept: RADIOLOGY | Facility: HOSPITAL | Age: 87
Discharge: HOME | End: 2022-04-29
Attending: INTERNAL MEDICINE
Payer: MEDICARE

## 2022-04-29 ENCOUNTER — OFFICE VISIT (OUTPATIENT)
Dept: PULMONOLOGY | Facility: CLINIC | Age: 87
End: 2022-04-29
Payer: MEDICARE

## 2022-04-29 VITALS
HEIGHT: 67 IN | DIASTOLIC BLOOD PRESSURE: 70 MMHG | BODY MASS INDEX: 27.47 KG/M2 | SYSTOLIC BLOOD PRESSURE: 100 MMHG | WEIGHT: 175 LBS | HEART RATE: 99 BPM | OXYGEN SATURATION: 95 %

## 2022-04-29 DIAGNOSIS — U07.1 COVID: ICD-10-CM

## 2022-04-29 DIAGNOSIS — J98.4 LUNG DENSITY ON X-RAY: ICD-10-CM

## 2022-04-29 DIAGNOSIS — R93.89 ABNORMAL CT OF THE CHEST: ICD-10-CM

## 2022-04-29 DIAGNOSIS — R91.8 LUNG MASS: ICD-10-CM

## 2022-04-29 DIAGNOSIS — I26.99 PULMONARY EMBOLISM, UNSPECIFIED CHRONICITY, UNSPECIFIED PULMONARY EMBOLISM TYPE, UNSPECIFIED WHETHER ACUTE COR PULMONALE PRESENT: ICD-10-CM

## 2022-04-29 PROCEDURE — 71046 X-RAY EXAM CHEST 2 VIEWS: CPT | Mod: TC

## 2022-04-29 PROCEDURE — 99203 PR OFFICE/OUTPT VISIT, NEW, LEVL III, 30-44 MIN: ICD-10-PCS | Mod: S$PBB,GW,, | Performed by: INTERNAL MEDICINE

## 2022-04-29 PROCEDURE — 99215 OFFICE O/P EST HI 40 MIN: CPT | Mod: PBBFAC,25 | Performed by: INTERNAL MEDICINE

## 2022-04-29 PROCEDURE — 99203 OFFICE O/P NEW LOW 30 MIN: CPT | Mod: S$PBB,GW,, | Performed by: INTERNAL MEDICINE

## 2022-04-29 PROCEDURE — 71046 XR CHEST PA AND LATERAL: ICD-10-PCS | Mod: 26,CR,GW,ICN

## 2022-04-29 PROCEDURE — 71046 X-RAY EXAM CHEST 2 VIEWS: CPT | Mod: 26,CR,GW,ICN

## 2022-04-29 NOTE — PROGRESS NOTES
Subjective:       Patient ID: Denzel Hsu is a 86 y.o. female.    Chief Complaint: Dizziness (X-ray lung results, dizziness )    Patient presents today for evaluation of right upper lobe lung mass does have some chest pain and cough also associated with no hemoptysis no weight loss    Past Medical History:   Diagnosis Date    Anemia     Diabetes mellitus, type 2     Hypertension     Tachycardia     Thyroid disease      Past Surgical History:   Procedure Laterality Date    SALPINGECTOMY       Family History   Problem Relation Age of Onset    No Known Problems Mother     No Known Problems Father     No Known Problems Sister     No Known Problems Brother     No Known Problems Maternal Aunt     No Known Problems Maternal Uncle     No Known Problems Paternal Aunt     No Known Problems Paternal Uncle     No Known Problems Maternal Grandmother     No Known Problems Maternal Grandfather     No Known Problems Paternal Grandmother     No Known Problems Paternal Grandfather      Review of patient's allergies indicates:   Allergen Reactions    Aspirin Nausea And Vomiting    Morphine Nausea Only    Propoxyphene Nausea And Vomiting      Social History     Tobacco Use    Smoking status: Never Smoker    Smokeless tobacco: Never Used   Substance Use Topics    Alcohol use: Never    Drug use: Never      Review of Systems   Constitutional: Negative for chills, activity change and night sweats.   HENT: Negative for congestion and ear pain.    Eyes: Negative for redness and itching.   Cardiovascular: Negative for chest pain and palpitations.   Musculoskeletal: Negative for arthralgias and back pain.   Skin: Negative for rash.   Gastrointestinal: Negative for abdominal pain and abdominal distention.   Neurological: Negative for dizziness and headaches.   Hematological: Negative for adenopathy. Does not bruise/bleed easily.   Psychiatric/Behavioral: Negative for confusion. The patient is not nervous/anxious.         Objective:      Physical Exam   Constitutional: She is oriented to person, place, and time. She appears well-developed and well-nourished.   HENT:   Head: Normocephalic.   Nose: Nose normal.   Mouth/Throat: Oropharynx is clear and moist.   Neck: No JVD present. No thyromegaly present.   Cardiovascular: Normal rate, regular rhythm, normal heart sounds and intact distal pulses.   Pulmonary/Chest: Normal expansion, hyperinflation, symmetric chest wall expansion, effort normal and breath sounds normal.   Abdominal: Soft. Bowel sounds are normal.   Musculoskeletal:         General: Normal range of motion.      Cervical back: Normal range of motion and neck supple.   Lymphadenopathy: No supraclavicular adenopathy is present.     She has no cervical adenopathy.     She has no axillary adenopathy.   Neurological: She is alert and oriented to person, place, and time. She has normal reflexes.   Skin: Skin is warm and dry.   Psychiatric: She has a normal mood and affect. Her behavior is normal.     Personal Diagnostic Review  Right upper lobe lung mass    No flowsheet data found.      Assessment:       1. Lung density on x-ray    2. Lung mass        Outpatient Encounter Medications as of 4/29/2022   Medication Sig Dispense Refill    acetaminophen (TYLENOL) 325 MG tablet Take 325 mg by mouth every 6 (six) hours as needed for Pain.      amLODIPine (NORVASC) 5 MG tablet Take 5 mg by mouth once daily.      DULoxetine (CYMBALTA) 30 MG capsule Take 30 mg by mouth once daily.      ELIQUIS 5 mg Tab Take 5 mg by mouth 2 (two) times daily.      furosemide (LASIX) 20 MG tablet Take 20 mg by mouth once daily.      gabapentin (NEURONTIN) 600 MG tablet       levothyroxine (SYNTHROID) 50 MCG tablet Take 50 mcg by mouth once daily.      ondansetron (ZOFRAN) 4 MG tablet       temazepam (RESTORIL) 30 mg capsule Take 1 capsule by mouth every evening.      tiZANidine (ZANAFLEX) 2 MG tablet        No facility-administered encounter  medications on file as of 4/29/2022.     No orders of the defined types were placed in this encounter.      Plan:       Problem List Items Addressed This Visit        Pulmonary    Lung mass     Patient with lung mass in the right upper lobe proceed with bronchoscopy will probably need a CT chest done get answer.  Risk versus benefit explained to patient informed consent obtained             Other Visit Diagnoses     Lung density on x-ray        Relevant Orders    BRONCHOSCOPY

## 2022-04-29 NOTE — ASSESSMENT & PLAN NOTE
Patient with lung mass in the right upper lobe proceed with bronchoscopy will probably need a CT chest done get answer.  Risk versus benefit explained to patient informed consent obtained

## 2022-05-04 ENCOUNTER — HOSPITAL ENCOUNTER (OUTPATIENT)
Dept: GASTROENTEROLOGY | Facility: HOSPITAL | Age: 87
Discharge: HOME | End: 2022-05-04
Attending: INTERNAL MEDICINE
Payer: MEDICARE

## 2022-05-04 VITALS
WEIGHT: 198 LBS | BODY MASS INDEX: 31.08 KG/M2 | OXYGEN SATURATION: 100 % | HEIGHT: 67 IN | DIASTOLIC BLOOD PRESSURE: 84 MMHG | RESPIRATION RATE: 17 BRPM | HEART RATE: 83 BPM | TEMPERATURE: 98 F | SYSTOLIC BLOOD PRESSURE: 143 MMHG

## 2022-05-04 DIAGNOSIS — J98.4 LUNG DENSITY ON X-RAY: ICD-10-CM

## 2022-05-04 LAB — DIRECT PREP: NORMAL

## 2022-05-04 PROCEDURE — 88108 NON-GYN CYTOLOGY: ICD-10-PCS | Mod: 26,GW,, | Performed by: PATHOLOGY

## 2022-05-04 PROCEDURE — 31622 DX BRONCHOSCOPE/WASH: CPT | Mod: GW,ICN,, | Performed by: INTERNAL MEDICINE

## 2022-05-04 PROCEDURE — 87102 FUNGUS ISOLATION CULTURE: CPT | Performed by: INTERNAL MEDICINE

## 2022-05-04 PROCEDURE — 88305 NON-GYN CYTOLOGY: ICD-10-PCS | Mod: 26,GW,, | Performed by: PATHOLOGY

## 2022-05-04 PROCEDURE — 87070 CULTURE OTHR SPECIMN AEROBIC: CPT | Performed by: INTERNAL MEDICINE

## 2022-05-04 PROCEDURE — 87206 SMEAR FLUORESCENT/ACID STAI: CPT | Performed by: INTERNAL MEDICINE

## 2022-05-04 PROCEDURE — 63600175 PHARM REV CODE 636 W HCPCS: Performed by: INTERNAL MEDICINE

## 2022-05-04 PROCEDURE — 88108 CYTOPATH CONCENTRATE TECH: CPT | Mod: 26,GW,, | Performed by: PATHOLOGY

## 2022-05-04 PROCEDURE — 31622 PR BRONCHOSCOPY,DIAGNOSTIC: ICD-10-PCS | Mod: GW,ICN,, | Performed by: INTERNAL MEDICINE

## 2022-05-04 PROCEDURE — 88305 TISSUE EXAM BY PATHOLOGIST: CPT | Mod: 26,GW,, | Performed by: PATHOLOGY

## 2022-05-04 PROCEDURE — 27201423 OPTIME MED/SURG SUP & DEVICES STERILE SUPPLY

## 2022-05-04 PROCEDURE — 87116 MYCOBACTERIA CULTURE: CPT | Performed by: INTERNAL MEDICINE

## 2022-05-04 PROCEDURE — 88305 TISSUE EXAM BY PATHOLOGIST: CPT | Mod: MCY | Performed by: INTERNAL MEDICINE

## 2022-05-04 PROCEDURE — 87070 CULTURE OTHR SPECIMN AEROBIC: CPT | Mod: 91 | Performed by: INTERNAL MEDICINE

## 2022-05-04 PROCEDURE — 31622 DX BRONCHOSCOPE/WASH: CPT

## 2022-05-04 PROCEDURE — 87210 SMEAR WET MOUNT SALINE/INK: CPT | Performed by: INTERNAL MEDICINE

## 2022-05-04 RX ORDER — MEPERIDINE HYDROCHLORIDE 100 MG/ML
INJECTION INTRAMUSCULAR; INTRAVENOUS; SUBCUTANEOUS
Status: COMPLETED | OUTPATIENT
Start: 2022-05-04 | End: 2022-05-04

## 2022-05-04 RX ORDER — MIDAZOLAM HYDROCHLORIDE 1 MG/ML
INJECTION INTRAMUSCULAR; INTRAVENOUS
Status: COMPLETED | OUTPATIENT
Start: 2022-05-04 | End: 2022-05-04

## 2022-05-04 RX ADMIN — MIDAZOLAM HYDROCHLORIDE 2 MG: 1 INJECTION, SOLUTION INTRAMUSCULAR; INTRAVENOUS at 09:05

## 2022-05-04 RX ADMIN — Medication 50 MG: at 09:05

## 2022-05-04 NOTE — DISCHARGE INSTRUCTIONS
Procedure Date  5/4/22     Impression  Overall Impression: normal     Recommendation  Follow up bronchoscopy    NO DRIVING, OPERATING EQUIPMENT, OR SIGNING LEGAL DOCUMENTS FOR 24 HOURS.

## 2022-05-05 LAB
AFB SMEAR (FA): NORMAL
INSULIN SERPL-ACNC: NORMAL U[IU]/ML
LAB AP CLINICAL INFORMATION: NORMAL
LAB AP LABORATORY NOTES: NORMAL
LAB AP SPECIMEN A NON-GYN GENERAL CATEGORIZATION: NEGATIVE
LAB AP SPECIMEN A NON-GYN INTERPETATION: NORMAL

## 2022-05-06 LAB
CULTURE, LOWER RESPIRATORY: NORMAL
GRAM STN SPEC: NORMAL
GRAM STN SPEC: NORMAL

## 2022-05-09 NOTE — PROGRESS NOTES
PCP: Charissa Teixeira II, MD    Referring Provider:     Subjective:   Denzel Hsu is a 86 y.o. female, nonsmoker, with hx of diabetes, HTN who presents for evaluation of chest pains and shortness of breath    Patient states she has chest pain and shortness of breath for awhile. Chest pain occur daily, lasting 1 hr.  Pain relieved with rest.   She has hx of lung mass.  Has planned biopsy.       Denies CVA or MI.       EKG  5/10/22:  Sinus tachycardia.  Premature ventricular contractions.           4/11/22:  Sinus rhythm  with 1st degree A-V block.          Past Surgical History:   Procedure Laterality Date    SALPINGECTOMY        Family History   Problem Relation Age of Onset    No Known Problems Mother     No Known Problems Father     No Known Problems Sister     No Known Problems Brother     No Known Problems Maternal Aunt     No Known Problems Maternal Uncle     No Known Problems Paternal Aunt     No Known Problems Paternal Uncle     No Known Problems Maternal Grandmother     No Known Problems Maternal Grandfather     No Known Problems Paternal Grandmother     No Known Problems Paternal Grandfather       Social History     Socioeconomic History    Marital status: Single   Tobacco Use    Smoking status: Never Smoker    Smokeless tobacco: Never Used   Substance and Sexual Activity    Alcohol use: Never    Drug use: Never    Sexual activity: Not Currently        Lab Results   Component Value Date     04/11/2022    K 4.0 04/11/2022     04/11/2022    CO2 30 04/11/2022    BUN 13 04/11/2022    CREATININE 0.92 04/11/2022    CALCIUM 9.5 04/11/2022    ANIONGAP 15 04/11/2022    ESTGFRAFRICA 78 12/02/2021    EGFRNONAA 62 04/11/2022       Lab Results   Component Value Date    CHOL 189 06/02/2021    CHOL 180 06/28/2020     No results found for: HDL  No results found for: LDLCALC  No results found for: TRIG  No results found for: CHOLHDL    Lab Results   Component Value Date    WBC 5.85 04/11/2022  "   HGB 12.8 04/11/2022    HCT 39.3 04/11/2022    MCV 71.1 (L) 04/11/2022     04/11/2022           Current Outpatient Medications:     acetaminophen (TYLENOL) 325 MG tablet, Take 325 mg by mouth every 6 (six) hours as needed for Pain., Disp: , Rfl:     amLODIPine (NORVASC) 5 MG tablet, Take 5 mg by mouth once daily., Disp: , Rfl:     DULoxetine (CYMBALTA) 30 MG capsule, Take 30 mg by mouth once daily., Disp: , Rfl:     ELIQUIS 5 mg Tab, Take 5 mg by mouth 2 (two) times daily., Disp: , Rfl:     furosemide (LASIX) 20 MG tablet, Take 20 mg by mouth once daily., Disp: , Rfl:     gabapentin (NEURONTIN) 600 MG tablet, , Disp: , Rfl:     levothyroxine (SYNTHROID) 50 MCG tablet, Take 50 mcg by mouth once daily., Disp: , Rfl:     ondansetron (ZOFRAN) 4 MG tablet, , Disp: , Rfl:     temazepam (RESTORIL) 30 mg capsule, Take 1 capsule by mouth every evening., Disp: , Rfl:     tiZANidine (ZANAFLEX) 2 MG tablet, , Disp: , Rfl:        Review of Systems   Respiratory: Negative for cough and shortness of breath.    Cardiovascular: Positive for palpitations. Negative for chest pain, orthopnea, claudication, leg swelling and PND.         Objective:   /80 (BP Location: Left arm, Patient Position: Sitting)   Pulse 109   Ht 5' 7" (1.702 m)   Wt 82.6 kg (182 lb)   SpO2 (!) 94%   BMI 28.51 kg/m²     Physical Exam  Constitutional:       General: She is not in acute distress.  Eyes:      Extraocular Movements: Extraocular movements intact.   Cardiovascular:      Rate and Rhythm: Normal rate and regular rhythm.      Pulses: Normal pulses.      Heart sounds: Murmur (III/VI systolic ) heard.   Pulmonary:      Effort: Pulmonary effort is normal.      Breath sounds: Normal breath sounds.   Abdominal:      Palpations: Abdomen is soft.   Musculoskeletal:         General: No swelling.      Cervical back: Neck supple.   Skin:     Findings: No rash.   Neurological:      Mental Status: She is alert and oriented to person, " place, and time.           Assessment:     1. Palpitations  EKG 12-lead    Ambulatory referral/consult to Cardiology    EKG 12-lead   2. Shortness of breath  Echo   3. Chest pain, unspecified type  NM Myocardial Perfusion Spect Multi Pharmacologic    Nuclear Stress Test         Plan:     Problem List Items Addressed This Visit        Pulmonary    Shortness of breath    Relevant Orders    Echo       Cardiac/Vascular    Palpitations - Primary    Relevant Orders    EKG 12-lead    Chest pain    Relevant Orders    NM Myocardial Perfusion Spect Multi Pharmacologic    Nuclear Stress Test         #Chest pain   Typical chest pain, going on for the last few months, substernal, brought on by exertion relieved with rest.  Daily episodes of chest pain which is going to the restroom.    Lexiscan     #Shortness of breath   3/6 systolic murmur.  Echo    FUP in 4 weeks

## 2022-05-10 ENCOUNTER — OFFICE VISIT (OUTPATIENT)
Dept: CARDIOLOGY | Facility: CLINIC | Age: 87
End: 2022-05-10
Payer: MEDICARE

## 2022-05-10 VITALS
DIASTOLIC BLOOD PRESSURE: 80 MMHG | BODY MASS INDEX: 28.56 KG/M2 | HEART RATE: 109 BPM | OXYGEN SATURATION: 94 % | SYSTOLIC BLOOD PRESSURE: 138 MMHG | HEIGHT: 67 IN | WEIGHT: 182 LBS

## 2022-05-10 DIAGNOSIS — R00.2 PALPITATIONS: Primary | ICD-10-CM

## 2022-05-10 DIAGNOSIS — R07.9 CHEST PAIN, UNSPECIFIED TYPE: ICD-10-CM

## 2022-05-10 DIAGNOSIS — R06.02 SHORTNESS OF BREATH: ICD-10-CM

## 2022-05-10 PROCEDURE — 99214 OFFICE O/P EST MOD 30 MIN: CPT | Mod: PBBFAC | Performed by: INTERNAL MEDICINE

## 2022-05-10 PROCEDURE — 93005 ELECTROCARDIOGRAM TRACING: CPT | Mod: PBBFAC | Performed by: INTERNAL MEDICINE

## 2022-05-10 PROCEDURE — 93010 ELECTROCARDIOGRAM REPORT: CPT | Mod: S$PBB,GW,ICN, | Performed by: INTERNAL MEDICINE

## 2022-05-10 PROCEDURE — 90853 GROUP PSYCHOTHERAPY: CPT

## 2022-05-10 PROCEDURE — 99204 OFFICE O/P NEW MOD 45 MIN: CPT | Mod: S$PBB,GW,ICN, | Performed by: INTERNAL MEDICINE

## 2022-05-10 PROCEDURE — 93010 EKG 12-LEAD: ICD-10-PCS | Mod: S$PBB,GW,ICN, | Performed by: INTERNAL MEDICINE

## 2022-05-10 PROCEDURE — 99204 PR OFFICE/OUTPT VISIT, NEW, LEVL IV, 45-59 MIN: ICD-10-PCS | Mod: S$PBB,GW,ICN, | Performed by: INTERNAL MEDICINE

## 2022-05-12 PROCEDURE — 90853 GROUP PSYCHOTHERAPY: CPT

## 2022-05-16 PROCEDURE — 90853 GROUP PSYCHOTHERAPY: CPT

## 2022-05-17 PROCEDURE — 90853 GROUP PSYCHOTHERAPY: CPT

## 2022-05-18 ENCOUNTER — HOSPITAL ENCOUNTER (OUTPATIENT)
Dept: CARDIOLOGY | Facility: HOSPITAL | Age: 87
Discharge: HOME | End: 2022-05-18
Attending: INTERNAL MEDICINE
Payer: MEDICARE

## 2022-05-18 ENCOUNTER — HOSPITAL ENCOUNTER (OUTPATIENT)
Dept: RADIOLOGY | Facility: HOSPITAL | Age: 87
Discharge: HOME | End: 2022-05-18
Attending: INTERNAL MEDICINE
Payer: MEDICARE

## 2022-05-18 VITALS
WEIGHT: 200 LBS | BODY MASS INDEX: 31.39 KG/M2 | SYSTOLIC BLOOD PRESSURE: 165 MMHG | HEIGHT: 67 IN | HEART RATE: 75 BPM | DIASTOLIC BLOOD PRESSURE: 85 MMHG

## 2022-05-18 VITALS — WEIGHT: 182 LBS | BODY MASS INDEX: 28.56 KG/M2 | HEIGHT: 67 IN

## 2022-05-18 DIAGNOSIS — R07.9 CHEST PAIN, UNSPECIFIED TYPE: ICD-10-CM

## 2022-05-18 DIAGNOSIS — R06.02 SHORTNESS OF BREATH: ICD-10-CM

## 2022-05-18 LAB
AORTIC VALVE CUSP SEPERATION: 2.1 CM
AV INDEX (PROSTH): 0.87
AV MEAN GRADIENT: 5 MMHG
AV PEAK GRADIENT: 9 MMHG
AV REGURGITATION PRESSURE HALF TIME: 406 MS
AV VALVE AREA: 2.83 CM2
BSA FOR ECHO PROCEDURE: 1.98 M2
CV ECHO LV RWT: 0.37 CM
CV STRESS BASE HR: 75 BPM
DIASTOLIC BLOOD PRESSURE: 85 MMHG
DOP CALC AO PEAK VEL: 1.5 M/S
DOP CALC AO VTI: 32.22 CM
DOP CALC LVOT AREA: 3.2 CM2
DOP CALC LVOT DIAMETER: 2.03 CM
DOP CALC LVOT STROKE VOLUME: 91.06 CM3
DOP CALCLVOT PEAK VEL VTI: 28.15 CM
E WAVE DECELERATION TIME: 221 MSEC
E/A RATIO: 0.52
E/E' RATIO: 5.62 M/S
ECHO LV POSTERIOR WALL: 0.94 CM (ref 0.6–1.1)
EJECTION FRACTION: 65 %
FRACTIONAL SHORTENING: 47 % (ref 28–44)
INTERVENTRICULAR SEPTUM: 0.89 CM (ref 0.6–1.1)
IVC DIAMETER: 1.46 CM
LEFT ATRIUM SIZE: 3.41 CM
LEFT INTERNAL DIMENSION IN SYSTOLE: 2.68 CM (ref 2.1–4)
LEFT VENTRICLE DIASTOLIC VOLUME INDEX: 21.44 ML/M2
LEFT VENTRICLE DIASTOLIC VOLUME: 41.6 ML
LEFT VENTRICLE MASS INDEX: 86 G/M2
LEFT VENTRICLE SYSTOLIC VOLUME INDEX: 8.7 ML/M2
LEFT VENTRICLE SYSTOLIC VOLUME: 16.9 ML
LEFT VENTRICULAR INTERNAL DIMENSION IN DIASTOLE: 5.09 CM (ref 3.5–6)
LEFT VENTRICULAR MASS: 166.59 G
LV LATERAL E/E' RATIO: 4.92 M/S
LV SEPTAL E/E' RATIO: 6.56 M/S
LVOT MG: 4 MMHG
MV PEAK A VEL: 1.14 M/S
MV PEAK E VEL: 0.59 M/S
OHS CV CPX 1 MINUTE RECOVERY HEART RATE: 80 BPM
OHS CV CPX 85 PERCENT MAX PREDICTED HEART RATE MALE: 111
OHS CV CPX MAX PREDICTED HEART RATE: 130
OHS CV CPX PATIENT IS FEMALE: 1
OHS CV CPX PATIENT IS MALE: 0
OHS CV CPX PEAK DIASTOLIC BLOOD PRESSURE: 85 MMHG
OHS CV CPX PEAK HEAR RATE: 81 BPM
OHS CV CPX PEAK RATE PRESSURE PRODUCT: NORMAL
OHS CV CPX PEAK SYSTOLIC BLOOD PRESSURE: 165 MMHG
OHS CV CPX PERCENT MAX PREDICTED HEART RATE ACHIEVED: 62
OHS CV CPX RATE PRESSURE PRODUCT PRESENTING: NORMAL
PISA AR MAX VEL: 4.16 M/S
PISA TR MAX VEL: 2.56 M/S
RA PRESSURE: 8 MMHG
RA WIDTH: 2.16 CM
RIGHT ATRIAL AREA: 7.5 CM2
RIGHT VENTRICULAR END-DIASTOLIC DIMENSION: 2.5 CM
RIGHT VENTRICULAR LENGTH IN DIASTOLE (APICAL 4-CHAMBER VIEW): 5 CM
RV MID DIAMA: 1.8 CM
SYSTOLIC BLOOD PRESSURE: 165 MMHG
TDI LATERAL: 0.12 M/S
TDI SEPTAL: 0.09 M/S
TDI: 0.11 M/S
TR MAX PG: 26 MMHG
TRICUSPID ANNULAR PLANE SYSTOLIC EXCURSION: 1.89 CM
TV REST PULMONARY ARTERY PRESSURE: 34 MMHG

## 2022-05-18 PROCEDURE — 93018 NUCLEAR STRESS TEST (CUPID ONLY): ICD-10-PCS | Mod: GW,ICN,, | Performed by: INTERNAL MEDICINE

## 2022-05-18 PROCEDURE — 93016 NUCLEAR STRESS TEST (CUPID ONLY): ICD-10-PCS | Mod: GW,ICN,, | Performed by: NURSE PRACTITIONER

## 2022-05-18 PROCEDURE — 93306 TTE W/DOPPLER COMPLETE: CPT | Mod: 26,GW,ICN, | Performed by: INTERNAL MEDICINE

## 2022-05-18 PROCEDURE — 93016 CV STRESS TEST SUPVJ ONLY: CPT | Mod: GW,ICN,, | Performed by: NURSE PRACTITIONER

## 2022-05-18 PROCEDURE — 93018 CV STRESS TEST I&R ONLY: CPT | Mod: GW,ICN,, | Performed by: INTERNAL MEDICINE

## 2022-05-18 PROCEDURE — 93017 CV STRESS TEST TRACING ONLY: CPT

## 2022-05-18 PROCEDURE — 63600175 PHARM REV CODE 636 W HCPCS: Performed by: INTERNAL MEDICINE

## 2022-05-18 PROCEDURE — 78452 HT MUSCLE IMAGE SPECT MULT: CPT | Mod: 26,GW,ICN, | Performed by: INTERNAL MEDICINE

## 2022-05-18 PROCEDURE — 93306 TTE W/DOPPLER COMPLETE: CPT

## 2022-05-18 PROCEDURE — 93306 ECHO (CUPID ONLY): ICD-10-PCS | Mod: 26,GW,ICN, | Performed by: INTERNAL MEDICINE

## 2022-05-18 PROCEDURE — 78452 NM MYOCARDIAL PERFUSION SPECT MULTI PHARM: ICD-10-PCS | Mod: 26,GW,ICN, | Performed by: INTERNAL MEDICINE

## 2022-05-18 PROCEDURE — A9500 TC99M SESTAMIBI: HCPCS

## 2022-05-18 RX ORDER — REGADENOSON 0.08 MG/ML
0.4 INJECTION, SOLUTION INTRAVENOUS ONCE
Status: COMPLETED | OUTPATIENT
Start: 2022-05-18 | End: 2022-05-18

## 2022-05-18 RX ADMIN — REGADENOSON 0.4 MG: 0.08 INJECTION, SOLUTION INTRAVENOUS at 10:05

## 2022-05-23 ENCOUNTER — HOSPITAL ENCOUNTER (OUTPATIENT)
Dept: RADIOLOGY | Facility: HOSPITAL | Age: 87
Discharge: HOME | End: 2022-05-23
Attending: ORTHOPAEDIC SURGERY
Payer: MEDICARE

## 2022-05-23 DIAGNOSIS — M19.011 ARTHRITIS OF RIGHT ACROMIOCLAVICULAR JOINT: ICD-10-CM

## 2022-05-23 PROCEDURE — 73030 X-RAY EXAM OF SHOULDER: CPT | Mod: TC,RT

## 2022-05-23 PROCEDURE — 90853 GROUP PSYCHOTHERAPY: CPT

## 2022-05-24 PROCEDURE — 90853 GROUP PSYCHOTHERAPY: CPT

## 2022-05-26 PROCEDURE — 90853 GROUP PSYCHOTHERAPY: CPT

## 2022-06-01 ENCOUNTER — OFFICE VISIT (OUTPATIENT)
Dept: PULMONOLOGY | Facility: CLINIC | Age: 87
End: 2022-06-01
Payer: MEDICARE

## 2022-06-01 VITALS
HEIGHT: 67 IN | WEIGHT: 182 LBS | OXYGEN SATURATION: 95 % | RESPIRATION RATE: 18 BRPM | BODY MASS INDEX: 28.56 KG/M2 | HEART RATE: 95 BPM

## 2022-06-01 DIAGNOSIS — R91.8 LUNG MASS: Primary | ICD-10-CM

## 2022-06-01 PROCEDURE — 99214 OFFICE O/P EST MOD 30 MIN: CPT | Mod: PBBFAC | Performed by: INTERNAL MEDICINE

## 2022-06-01 PROCEDURE — 99213 PR OFFICE/OUTPT VISIT, EST, LEVL III, 20-29 MIN: ICD-10-PCS | Mod: S$PBB,GW,ICN, | Performed by: INTERNAL MEDICINE

## 2022-06-01 PROCEDURE — 99213 OFFICE O/P EST LOW 20 MIN: CPT | Mod: S$PBB,GW,ICN, | Performed by: INTERNAL MEDICINE

## 2022-06-01 NOTE — ASSESSMENT & PLAN NOTE
Bronchoscopy negative patient needs a CT directed needle biopsy obtained informed consent talked about pneumothorax and bleeding as risk factors after this procedure

## 2022-06-01 NOTE — PROGRESS NOTES
Subjective:       Patient ID: Denzel Hsu is a 86 y.o. female.    Chief Complaint: Pulmonary Nodules (2 month follow up )    Pulmonary Nodules  This is a chronic problem. The current episode started more than 1 month ago. The problem occurs rarely. The problem has been unchanged. Pertinent negatives include no abdominal pain, arthralgias, chest pain, chills, congestion, headaches or rash.     Past Medical History:   Diagnosis Date    Anemia     Diabetes mellitus, type 2     Hypertension     Tachycardia     Thyroid disease      Past Surgical History:   Procedure Laterality Date    SALPINGECTOMY       Family History   Problem Relation Age of Onset    No Known Problems Mother     No Known Problems Father     No Known Problems Sister     No Known Problems Brother     No Known Problems Maternal Aunt     No Known Problems Maternal Uncle     No Known Problems Paternal Aunt     No Known Problems Paternal Uncle     No Known Problems Maternal Grandmother     No Known Problems Maternal Grandfather     No Known Problems Paternal Grandmother     No Known Problems Paternal Grandfather      Review of patient's allergies indicates:   Allergen Reactions    Aspirin Nausea And Vomiting    Morphine Nausea Only    Propoxyphene Nausea And Vomiting      Social History     Tobacco Use    Smoking status: Never Smoker    Smokeless tobacco: Never Used   Substance Use Topics    Alcohol use: Never    Drug use: Never      Review of Systems   Constitutional: Negative for chills, activity change and night sweats.   HENT: Negative for congestion and ear pain.    Eyes: Negative for redness and itching.   Cardiovascular: Negative for chest pain and palpitations.   Musculoskeletal: Negative for arthralgias and back pain.   Skin: Negative for rash.   Gastrointestinal: Negative for abdominal pain and abdominal distention.   Neurological: Negative for dizziness and headaches.   Hematological: Negative for adenopathy. Does not  bruise/bleed easily.   Psychiatric/Behavioral: Negative for confusion. The patient is not nervous/anxious.        Objective:      Physical Exam   Constitutional: She is oriented to person, place, and time. She appears well-developed and well-nourished.   HENT:   Head: Normocephalic.   Nose: Nose normal.   Mouth/Throat: Oropharynx is clear and moist.   Neck: No JVD present. No thyromegaly present.   Cardiovascular: Normal rate, regular rhythm, normal heart sounds and intact distal pulses.   Pulmonary/Chest: Normal expansion, hyperinflation, symmetric chest wall expansion, effort normal and breath sounds normal.   Abdominal: Soft. Bowel sounds are normal.   Musculoskeletal:         General: Normal range of motion.      Cervical back: Normal range of motion and neck supple.   Lymphadenopathy: No supraclavicular adenopathy is present.     She has no cervical adenopathy.   Neurological: She is alert and oriented to person, place, and time. She has normal reflexes.   Skin: Skin is warm and dry.   Psychiatric: She has a normal mood and affect. Her behavior is normal.     Personal Diagnostic Review  CT chest unremarkable    No flowsheet data found.      Assessment:       1. Lung mass        Outpatient Encounter Medications as of 6/1/2022   Medication Sig Dispense Refill    acetaminophen (TYLENOL) 325 MG tablet Take 325 mg by mouth every 6 (six) hours as needed for Pain.      amLODIPine (NORVASC) 5 MG tablet Take 5 mg by mouth once daily.      DULoxetine (CYMBALTA) 30 MG capsule Take 30 mg by mouth once daily.      ELIQUIS 5 mg Tab Take 5 mg by mouth 2 (two) times daily.      furosemide (LASIX) 20 MG tablet Take 20 mg by mouth once daily.      gabapentin (NEURONTIN) 600 MG tablet       levothyroxine (SYNTHROID) 50 MCG tablet Take 50 mcg by mouth once daily.      ondansetron (ZOFRAN) 4 MG tablet       temazepam (RESTORIL) 30 mg capsule Take 1 capsule by mouth every evening.      tiZANidine (ZANAFLEX) 2 MG tablet         No facility-administered encounter medications on file as of 6/1/2022.     Orders Placed This Encounter   Procedures    CT Biopsy Lung w/ guidance     Standing Status:   Future     Standing Expiration Date:   6/1/2023     Order Specific Question:   Reason for Exam:     Answer:   lung mass     Order Specific Question:   May the Radiologist modify the order per protocol to meet the clinical needs of the patient?     Answer:   Yes       Plan:       Problem List Items Addressed This Visit        Pulmonary    Lung mass - Primary     Bronchoscopy negative patient needs a CT directed needle biopsy obtained informed consent talked about pneumothorax and bleeding as risk factors after this procedure           Relevant Orders    CT Biopsy Lung w/ guidance

## 2022-06-02 PROCEDURE — 90853 GROUP PSYCHOTHERAPY: CPT

## 2022-06-06 PROCEDURE — 90853 GROUP PSYCHOTHERAPY: CPT

## 2022-06-09 PROCEDURE — 90853 GROUP PSYCHOTHERAPY: CPT

## 2022-06-10 PROCEDURE — 90853 GROUP PSYCHOTHERAPY: CPT

## 2022-06-12 LAB — CULTURE, FUNGUS (OTHER): NORMAL

## 2022-06-14 ENCOUNTER — HOSPITAL ENCOUNTER (OUTPATIENT)
Dept: RADIOLOGY | Facility: HOSPITAL | Age: 87
Discharge: HOME | End: 2022-06-14
Attending: INTERNAL MEDICINE
Payer: MEDICARE

## 2022-06-14 VITALS
SYSTOLIC BLOOD PRESSURE: 140 MMHG | DIASTOLIC BLOOD PRESSURE: 74 MMHG | HEIGHT: 67 IN | WEIGHT: 181 LBS | TEMPERATURE: 98 F | OXYGEN SATURATION: 97 % | RESPIRATION RATE: 18 BRPM | BODY MASS INDEX: 28.41 KG/M2 | HEART RATE: 81 BPM

## 2022-06-14 DIAGNOSIS — R91.8 LUNG MASS: ICD-10-CM

## 2022-06-14 LAB
ANISOCYTOSIS BLD QL SMEAR: NORMAL
APTT PPP: 25.2 SECONDS (ref 25.2–37.3)
BASOPHILS # BLD AUTO: 0.05 K/UL (ref 0–0.2)
BASOPHILS NFR BLD AUTO: 0.8 % (ref 0–1)
DIFFERENTIAL METHOD BLD: ABNORMAL
EOSINOPHIL # BLD AUTO: 0.11 K/UL (ref 0–0.5)
EOSINOPHIL NFR BLD AUTO: 1.9 % (ref 1–4)
ERYTHROCYTE [DISTWIDTH] IN BLOOD BY AUTOMATED COUNT: 18.1 % (ref 11.5–14.5)
HCT VFR BLD AUTO: 37 % (ref 38–47)
HGB BLD-MCNC: 12.3 G/DL (ref 12–16)
IMM GRANULOCYTES # BLD AUTO: 0.02 K/UL (ref 0–0.04)
IMM GRANULOCYTES NFR BLD: 0.3 % (ref 0–0.4)
INR BLD: 1.07 (ref 0.9–1.1)
LYMPHOCYTES # BLD AUTO: 1.74 K/UL (ref 1–4.8)
LYMPHOCYTES NFR BLD AUTO: 29.4 % (ref 27–41)
MCH RBC QN AUTO: 23.9 PG (ref 27–31)
MCHC RBC AUTO-ENTMCNC: 33.2 G/DL (ref 32–36)
MCV RBC AUTO: 72 FL (ref 80–96)
MICROCYTES BLD QL SMEAR: NORMAL
MONOCYTES # BLD AUTO: 0.46 K/UL (ref 0–0.8)
MONOCYTES NFR BLD AUTO: 7.8 % (ref 2–6)
MPC BLD CALC-MCNC: 9.8 FL (ref 9.4–12.4)
NEUTROPHILS # BLD AUTO: 3.54 K/UL (ref 1.8–7.7)
NEUTROPHILS NFR BLD AUTO: 59.8 % (ref 53–65)
NRBC # BLD AUTO: 0 X10E3/UL
NRBC, AUTO (.00): 0 %
PLATELET # BLD AUTO: 261 K/UL (ref 150–400)
PLATELET MORPHOLOGY: NORMAL
PROTHROMBIN TIME: 13.5 SECONDS (ref 11.7–14.7)
RBC # BLD AUTO: 5.14 M/UL (ref 4.2–5.4)
WBC # BLD AUTO: 5.92 K/UL (ref 4.5–11)

## 2022-06-14 PROCEDURE — 71046 X-RAY EXAM CHEST 2 VIEWS: CPT | Mod: 26,GW,ICN, | Performed by: RADIOLOGY

## 2022-06-14 PROCEDURE — 71046 X-RAY EXAM CHEST 2 VIEWS: CPT | Mod: TC

## 2022-06-14 PROCEDURE — 85025 COMPLETE CBC W/AUTO DIFF WBC: CPT | Performed by: RADIOLOGY

## 2022-06-14 PROCEDURE — 36415 COLL VENOUS BLD VENIPUNCTURE: CPT | Performed by: RADIOLOGY

## 2022-06-14 PROCEDURE — 85730 THROMBOPLASTIN TIME PARTIAL: CPT | Performed by: RADIOLOGY

## 2022-06-14 PROCEDURE — 99153 MOD SED SAME PHYS/QHP EA: CPT

## 2022-06-14 PROCEDURE — 63600175 PHARM REV CODE 636 W HCPCS: Performed by: RADIOLOGY

## 2022-06-14 PROCEDURE — 32408 CORE NDL BX LNG/MED PERQ: CPT

## 2022-06-14 PROCEDURE — 88305 TISSUE EXAM BY PATHOLOGIST: CPT | Mod: 26,GW,, | Performed by: PATHOLOGY

## 2022-06-14 PROCEDURE — 32408 CORE NDL BX LNG/MED PERQ: CPT | Mod: GW,ICN,, | Performed by: RADIOLOGY

## 2022-06-14 PROCEDURE — 88341 IMHCHEM/IMCYTCHM EA ADD ANTB: CPT | Mod: 26,GW,, | Performed by: PATHOLOGY

## 2022-06-14 PROCEDURE — 99152 MOD SED SAME PHYS/QHP 5/>YRS: CPT

## 2022-06-14 PROCEDURE — 32408 CT BIOPSY LUNG W/ GUIDANCE: ICD-10-PCS | Mod: GW,ICN,, | Performed by: RADIOLOGY

## 2022-06-14 PROCEDURE — 88305 TISSUE EXAM BY PATHOLOGIST: CPT | Mod: SUR | Performed by: INTERNAL MEDICINE

## 2022-06-14 PROCEDURE — 88341 SURGICAL PATHOLOGY: ICD-10-PCS | Mod: 26,GW,, | Performed by: PATHOLOGY

## 2022-06-14 PROCEDURE — 71046 XR CHEST 2 VIEW INSPIRATION EXPIRATION: ICD-10-PCS | Mod: 26,76,GW,ICN | Performed by: RADIOLOGY

## 2022-06-14 PROCEDURE — 71046 X-RAY EXAM CHEST 2 VIEWS: CPT | Mod: 26,76,GW,ICN | Performed by: RADIOLOGY

## 2022-06-14 PROCEDURE — 88342 IMHCHEM/IMCYTCHM 1ST ANTB: CPT | Mod: 26,GW,, | Performed by: PATHOLOGY

## 2022-06-14 PROCEDURE — 71046 XR CHEST 2 VIEW INSPIRATION EXPIRATION: ICD-10-PCS | Mod: 26,GW,ICN, | Performed by: RADIOLOGY

## 2022-06-14 PROCEDURE — 88305 SURGICAL PATHOLOGY: ICD-10-PCS | Mod: 26,GW,, | Performed by: PATHOLOGY

## 2022-06-14 PROCEDURE — 88342 SURGICAL PATHOLOGY: ICD-10-PCS | Mod: 26,GW,, | Performed by: PATHOLOGY

## 2022-06-14 RX ORDER — TRAMADOL HYDROCHLORIDE 50 MG/1
50 TABLET ORAL EVERY 6 HOURS PRN
COMMUNITY

## 2022-06-14 RX ORDER — FENTANYL CITRATE 50 UG/ML
INJECTION, SOLUTION INTRAMUSCULAR; INTRAVENOUS CODE/TRAUMA/SEDATION MEDICATION
Status: COMPLETED | OUTPATIENT
Start: 2022-06-14 | End: 2022-06-14

## 2022-06-14 RX ORDER — NAPROXEN 250 MG/1
500 TABLET ORAL 2 TIMES DAILY WITH MEALS
COMMUNITY

## 2022-06-14 RX ORDER — SUVOREXANT 20 MG/1
20 TABLET, FILM COATED ORAL NIGHTLY
COMMUNITY

## 2022-06-14 RX ORDER — FERROUS SULFATE, DRIED 160(50) MG
1 TABLET, EXTENDED RELEASE ORAL 2 TIMES DAILY WITH MEALS
COMMUNITY

## 2022-06-14 RX ORDER — MIDAZOLAM HYDROCHLORIDE 1 MG/ML
INJECTION INTRAMUSCULAR; INTRAVENOUS CODE/TRAUMA/SEDATION MEDICATION
Status: COMPLETED | OUTPATIENT
Start: 2022-06-14 | End: 2022-06-14

## 2022-06-14 RX ORDER — PNV NO.95/FERROUS FUM/FOLIC AC 28MG-0.8MG
100 TABLET ORAL DAILY
COMMUNITY

## 2022-06-14 RX ADMIN — MIDAZOLAM HYDROCHLORIDE 1 MG: 1 INJECTION, SOLUTION INTRAMUSCULAR; INTRAVENOUS at 11:06

## 2022-06-14 RX ADMIN — FENTANYL CITRATE 50 MCG: 50 INJECTION INTRAMUSCULAR; INTRAVENOUS at 11:06

## 2022-06-14 NOTE — PROGRESS NOTES
Right lung biopsy  Performed by Dr. NIKO Faustin  Procedure was explained to the patient to include risks and possible complications (bleeding, infection, pneumothorax).  Patient agreed to procedure consent is signed.  A formal timeout was called all staff present agreed to patient and procedure.  The right upper thorax was prepped with ChloraPrep and sterile field was established.  1% lidocaine was used as local anesthetic.  Conscious sedation was also utilized, please see nurse's note.  Under CT guidance 2 20 gauge core biopsies were taken from the right upper lobe lung mass.  The needle was removed and the puncture site was cleaned and bandaged.  The patient tolerated the procedure well there were no immediate postprocedure complications.  The patient will be transferred to Diagnostic Radiology for a post biopsy chest x-ray.

## 2022-06-14 NOTE — PLAN OF CARE
Rec'd  Back to [facility]6. Awake, alert. No complaints. Dsg rt chest d/i. VSS. Lunch tray served. Siderails up and call bell inr each

## 2022-06-15 LAB
DHEA SERPL-MCNC: NORMAL
ESTROGEN SERPL-MCNC: NORMAL PG/ML
INSULIN SERPL-ACNC: NORMAL U[IU]/ML
LAB AP CLINICAL INFORMATION: NORMAL
LAB AP GROSS DESCRIPTION: NORMAL
LAB AP LABORATORY NOTES: NORMAL
T3RU NFR SERPL: NORMAL %

## 2022-06-16 PROCEDURE — 90853 GROUP PSYCHOTHERAPY: CPT

## 2022-06-20 PROCEDURE — 90853 GROUP PSYCHOTHERAPY: CPT

## 2022-06-21 PROCEDURE — 90853 GROUP PSYCHOTHERAPY: CPT

## 2022-06-22 ENCOUNTER — TELEPHONE (OUTPATIENT)
Dept: PULMONOLOGY | Facility: CLINIC | Age: 87
End: 2022-06-22
Payer: MEDICARE

## 2022-06-22 PROCEDURE — 90853 GROUP PSYCHOTHERAPY: CPT

## 2022-06-22 NOTE — TELEPHONE ENCOUNTER
Call was placed to [facility]: [phone].  Caretaker was told that  missed today's follow-up appointment with .  Jodi, Caretaker, reported that [facility] understood that  was scheduled for appt and did not realize that appointment was missed.    Appt for tomorrow am was offered and phone numbers were shared to confirm for tomorrow or to assist with rescheduling.///gp

## 2022-06-23 ENCOUNTER — OFFICE VISIT (OUTPATIENT)
Dept: PULMONOLOGY | Facility: CLINIC | Age: 87
End: 2022-06-23
Payer: MEDICARE

## 2022-06-23 VITALS
WEIGHT: 181 LBS | OXYGEN SATURATION: 97 % | RESPIRATION RATE: 16 BRPM | HEIGHT: 67 IN | HEART RATE: 100 BPM | DIASTOLIC BLOOD PRESSURE: 60 MMHG | SYSTOLIC BLOOD PRESSURE: 90 MMHG | BODY MASS INDEX: 28.41 KG/M2

## 2022-06-23 DIAGNOSIS — R91.8 LUNG MASS: ICD-10-CM

## 2022-06-23 PROCEDURE — 99215 OFFICE O/P EST HI 40 MIN: CPT | Mod: PBBFAC | Performed by: INTERNAL MEDICINE

## 2022-06-23 PROCEDURE — 99214 OFFICE O/P EST MOD 30 MIN: CPT | Mod: S$PBB,GW,ICN, | Performed by: INTERNAL MEDICINE

## 2022-06-23 PROCEDURE — 99214 PR OFFICE/OUTPT VISIT, EST, LEVL IV, 30-39 MIN: ICD-10-PCS | Mod: S$PBB,GW,ICN, | Performed by: INTERNAL MEDICINE

## 2022-06-24 NOTE — PROGRESS NOTES
Subjective:       Patient ID: Denzel Hsu is a 87 y.o. female.    Chief Complaint: Pulmonary Nodules (Follow-up after CT Bx 6/14/22.  C/O SOB with activity, occ cough.)    Pulmonary Nodules  This is a chronic problem. The current episode started more than 1 month ago. The problem occurs constantly. The problem has been unchanged. Pertinent negatives include no abdominal pain, arthralgias, chest pain, chills, congestion, headaches or rash. Nothing aggravates the symptoms. She has tried nothing for the symptoms.     Past Medical History:   Diagnosis Date    Anemia     COVID     Diabetes mellitus, type 2     Hypertension     Lung mass     Pulmonary embolus     Tachycardia     Thyroid disease      Past Surgical History:   Procedure Laterality Date    BRONCHOSCOPY      5/4/22,     LUNG BIOPSY      6/14/22    SALPINGECTOMY       Family History   Problem Relation Age of Onset    No Known Problems Mother     No Known Problems Father     No Known Problems Sister     No Known Problems Brother     No Known Problems Maternal Aunt     No Known Problems Maternal Uncle     No Known Problems Paternal Aunt     No Known Problems Paternal Uncle     No Known Problems Maternal Grandmother     No Known Problems Maternal Grandfather     No Known Problems Paternal Grandmother     No Known Problems Paternal Grandfather      Review of patient's allergies indicates:   Allergen Reactions    Aspirin Nausea And Vomiting    Morphine Nausea Only    Propoxyphene Nausea And Vomiting      Social History     Tobacco Use    Smoking status: Never Smoker    Smokeless tobacco: Never Used   Substance Use Topics    Alcohol use: Never    Drug use: Never      Review of Systems   Constitutional: Negative for chills, activity change and night sweats.   HENT: Negative for congestion and ear pain.    Eyes: Negative for redness and itching.   Cardiovascular: Negative for chest pain and palpitations.   Musculoskeletal:  Negative for arthralgias and back pain.   Skin: Negative for rash.   Gastrointestinal: Negative for abdominal pain and abdominal distention.   Neurological: Negative for dizziness and headaches.   Hematological: Negative for adenopathy. Does not bruise/bleed easily.   Psychiatric/Behavioral: Negative for confusion. The patient is not nervous/anxious.        Objective:      Physical Exam   Constitutional: She is oriented to person, place, and time. She appears well-developed and well-nourished.   HENT:   Head: Normocephalic.   Nose: Nose normal.   Mouth/Throat: Oropharynx is clear and moist.   Neck: No JVD present. No thyromegaly present.   Cardiovascular: Normal rate, regular rhythm, normal heart sounds and intact distal pulses.   Pulmonary/Chest: Normal expansion, hyperinflation, symmetric chest wall expansion, effort normal and breath sounds normal.   Abdominal: Soft. Bowel sounds are normal.   Musculoskeletal:         General: Normal range of motion.      Cervical back: Normal range of motion and neck supple.   Lymphadenopathy: No supraclavicular adenopathy is present.     She has no cervical adenopathy.   Neurological: She is alert and oriented to person, place, and time. She has normal reflexes.   Skin: Skin is warm and dry.   Psychiatric: She has a normal mood and affect. Her behavior is normal.     Personal Diagnostic Review  none pertinent    No flowsheet data found.      Assessment:       1. Lung mass        Outpatient Encounter Medications as of 6/23/2022   Medication Sig Dispense Refill    acetaminophen (TYLENOL) 325 MG tablet Take 325 mg by mouth every 6 (six) hours as needed for Pain.      amLODIPine (NORVASC) 5 MG tablet Take 5 mg by mouth once daily.      calcium-vitamin D3 (OS-LORIN 500 + D3) 500 mg-5 mcg (200 unit) per tablet Take 1 tablet by mouth 2 (two) times daily with meals.      carboxymethylcellulose sodium 1 % Drop Apply to eye.      cyanocobalamin (VITAMIN B-12) 100 MCG tablet Take 100  mcg by mouth once daily.      DULoxetine (CYMBALTA) 30 MG capsule Take 30 mg by mouth once daily.      furosemide (LASIX) 20 MG tablet Take 20 mg by mouth once daily.      gabapentin (NEURONTIN) 600 MG tablet Take 600 mg by mouth 2 (two) times daily.      levothyroxine (SYNTHROID) 50 MCG tablet Take 50 mcg by mouth once daily.      multivit-mins no.63/iron/folic (M-VIT ORAL) Take by mouth.      naproxen (NAPROSYN) 250 MG tablet Take 500 mg by mouth 2 (two) times daily with meals.      olopatadine HCl (PATADAY OPHT) Apply to eye.      ondansetron (ZOFRAN) 4 MG tablet       suvorexant (BELSOMRA) 20 mg Tab Take 20 mg by mouth every evening.      tiZANidine (ZANAFLEX) 2 MG tablet       GUAIFENESIN ORAL Take 10 mLs by mouth 4 (four) times daily as needed.      traMADoL (ULTRAM) 50 mg tablet Take 50 mg by mouth every 6 (six) hours as needed for Pain.       No facility-administered encounter medications on file as of 6/23/2022.     No orders of the defined types were placed in this encounter.      Plan:       Problem List Items Addressed This Visit        Pulmonary    Lung mass     Adenocarcinoma of the lung she is 87 years old she is not a surgical candidate at her age will center to Oncology for evaluation for palliative chemotherapy

## 2022-06-24 NOTE — ASSESSMENT & PLAN NOTE
Adenocarcinoma of the lung she is 87 years old she is not a surgical candidate at her age will center to Oncology for evaluation for palliative chemotherapy

## 2022-06-27 ENCOUNTER — TELEPHONE (OUTPATIENT)
Dept: PULMONOLOGY | Facility: CLINIC | Age: 87
End: 2022-06-27
Payer: MEDICARE

## 2022-06-27 PROCEDURE — 90853 GROUP PSYCHOTHERAPY: CPT

## 2022-06-27 NOTE — TELEPHONE ENCOUNTER
"Packet fax'd to 's office  Phone -[phone]  Fax [phone]  with notes as outlined by [facility] Referral sheet:  Packet included   Referral sheet, demographics,Path report from CT Bx  Radiology reports (most recent CT and CXR), [facility] Mycardial Perfusion Scan, and last 3 clinic notes from .   Sent per VO /gp    Added note:  Voice mail message was received today  dated Friday 6/24/22: note asked that Samira be called   at [facility]:   [phone] "to discuss options and plans".  Call to that number forwarded to nursing station reported Samira was not working today.  Message left to voice mail for Samira's cell  [phone] that call had been returned.//gp  "

## 2022-06-28 ENCOUNTER — TELEPHONE (OUTPATIENT)
Dept: PULMONOLOGY | Facility: CLINIC | Age: 87
End: 2022-06-28
Payer: MEDICARE

## 2022-06-28 PROCEDURE — 90853 GROUP PSYCHOTHERAPY: CPT

## 2022-06-28 NOTE — TELEPHONE ENCOUNTER
Fax received from 's office and call made to nurse at [facility] ([phone]) confirming that they have received appt date and time for ONC appt:  7/7/22  :  9:00 AM arrival, 9:30 MD appt.    Nurse confirmed that she will be in touch with Samira who called recently about follow-up care and appointments for .//pg

## 2022-06-29 LAB — MYCOBACTERIUM SPEC CULT: NORMAL

## 2022-07-11 ENCOUNTER — LAB REQUISITION (OUTPATIENT)
Dept: LAB | Facility: HOSPITAL | Age: 87
End: 2022-07-11
Attending: FAMILY MEDICINE
Payer: MEDICARE

## 2022-07-11 DIAGNOSIS — U07.1 COVID-19: ICD-10-CM

## 2022-07-11 PROCEDURE — 87428 SARSCOV & INF VIR A&B AG IA: CPT | Performed by: FAMILY MEDICINE

## 2022-07-11 PROCEDURE — 87635 SARS-COV-2 COVID-19 AMP PRB: CPT | Performed by: FAMILY MEDICINE

## 2022-07-12 ENCOUNTER — HOSPITAL ENCOUNTER (EMERGENCY)
Facility: HOSPITAL | Age: 87
Discharge: HOME | End: 2022-07-12
Payer: MEDICARE

## 2022-07-12 VITALS
HEIGHT: 67 IN | RESPIRATION RATE: 18 BRPM | BODY MASS INDEX: 28.56 KG/M2 | DIASTOLIC BLOOD PRESSURE: 89 MMHG | HEART RATE: 77 BPM | OXYGEN SATURATION: 95 % | WEIGHT: 182 LBS | TEMPERATURE: 98 F | SYSTOLIC BLOOD PRESSURE: 137 MMHG

## 2022-07-12 DIAGNOSIS — R07.9 CHEST PAIN: ICD-10-CM

## 2022-07-12 DIAGNOSIS — R91.8 MASS OF RIGHT LUNG: ICD-10-CM

## 2022-07-12 DIAGNOSIS — G89.3 CANCER RELATED PAIN: ICD-10-CM

## 2022-07-12 DIAGNOSIS — R07.81 PLEURITIC CHEST PAIN: Primary | ICD-10-CM

## 2022-07-12 LAB
ALBUMIN SERPL BCP-MCNC: 3.5 G/DL (ref 3.5–5)
ALBUMIN/GLOB SERPL: 1 {RATIO}
ALP SERPL-CCNC: 138 U/L (ref 55–142)
ALT SERPL W P-5'-P-CCNC: 19 U/L (ref 13–56)
ANION GAP SERPL CALCULATED.3IONS-SCNC: 9 MMOL/L (ref 7–16)
ANISOCYTOSIS BLD QL SMEAR: NORMAL
APTT PPP: 26.2 SECONDS (ref 25.2–37.3)
AST SERPL W P-5'-P-CCNC: 15 U/L (ref 15–37)
BASOPHILS # BLD AUTO: 0.03 K/UL (ref 0–0.2)
BASOPHILS NFR BLD AUTO: 0.5 % (ref 0–1)
BILIRUB SERPL-MCNC: 0.5 MG/DL (ref 0–1.2)
BUN SERPL-MCNC: 19 MG/DL (ref 7–18)
BUN/CREAT SERPL: 17 (ref 6–20)
CALCIUM SERPL-MCNC: 9.1 MG/DL (ref 8.5–10.1)
CHLORIDE SERPL-SCNC: 105 MMOL/L (ref 98–107)
CO2 SERPL-SCNC: 34 MMOL/L (ref 21–32)
CREAT SERPL-MCNC: 1.14 MG/DL (ref 0.55–1.02)
DIFFERENTIAL METHOD BLD: ABNORMAL
EOSINOPHIL # BLD AUTO: 0.07 K/UL (ref 0–0.5)
EOSINOPHIL NFR BLD AUTO: 1.2 % (ref 1–4)
ERYTHROCYTE [DISTWIDTH] IN BLOOD BY AUTOMATED COUNT: 18 % (ref 11.5–14.5)
GLOBULIN SER-MCNC: 3.6 G/DL (ref 2–4)
GLUCOSE SERPL-MCNC: 143 MG/DL (ref 74–106)
HCT VFR BLD AUTO: 35.7 % (ref 38–47)
HGB BLD-MCNC: 12.1 G/DL (ref 12–16)
HYPOCHROMIA BLD QL SMEAR: NORMAL
INR BLD: 1.04 (ref 0.9–1.1)
LYMPHOCYTES # BLD AUTO: 1.51 K/UL (ref 1–4.8)
LYMPHOCYTES NFR BLD AUTO: 26.8 % (ref 27–41)
MCH RBC QN AUTO: 24.1 PG (ref 27–31)
MCHC RBC AUTO-ENTMCNC: 33.9 G/DL (ref 32–36)
MCV RBC AUTO: 71.1 FL (ref 80–96)
MICROCYTES BLD QL SMEAR: NORMAL
MONOCYTES # BLD AUTO: 0.42 K/UL (ref 0–0.8)
MONOCYTES NFR BLD AUTO: 7.4 % (ref 2–6)
MPC BLD CALC-MCNC: 9.5 FL (ref 9.4–12.4)
NEUTROPHILS # BLD AUTO: 3.61 K/UL (ref 1.8–7.7)
NEUTROPHILS NFR BLD AUTO: 64.1 % (ref 53–65)
PLATELET # BLD AUTO: 243 K/UL (ref 150–400)
PLATELET MORPHOLOGY: NORMAL
POTASSIUM SERPL-SCNC: 4.3 MMOL/L (ref 3.5–5.1)
PROT SERPL-MCNC: 7.1 G/DL (ref 6.4–8.2)
PROTHROMBIN TIME: 13.5 SECONDS (ref 11.7–14.7)
RBC # BLD AUTO: 5.02 M/UL (ref 4.2–5.4)
SODIUM SERPL-SCNC: 144 MMOL/L (ref 136–145)
TROPONIN I SERPL HS-MCNC: 8 PG/ML
WBC # BLD AUTO: 5.64 K/UL (ref 4.5–11)

## 2022-07-12 PROCEDURE — 25000003 PHARM REV CODE 250: Performed by: NURSE PRACTITIONER

## 2022-07-12 PROCEDURE — 99285 EMERGENCY DEPT VISIT HI MDM: CPT | Mod: 25

## 2022-07-12 PROCEDURE — 25500020 PHARM REV CODE 255: Performed by: NURSE PRACTITIONER

## 2022-07-12 PROCEDURE — 85730 THROMBOPLASTIN TIME PARTIAL: CPT | Performed by: NURSE PRACTITIONER

## 2022-07-12 PROCEDURE — 94761 N-INVAS EAR/PLS OXIMETRY MLT: CPT

## 2022-07-12 PROCEDURE — 85025 COMPLETE CBC W/AUTO DIFF WBC: CPT | Performed by: NURSE PRACTITIONER

## 2022-07-12 PROCEDURE — 36415 COLL VENOUS BLD VENIPUNCTURE: CPT | Performed by: NURSE PRACTITIONER

## 2022-07-12 PROCEDURE — 84484 ASSAY OF TROPONIN QUANT: CPT | Performed by: NURSE PRACTITIONER

## 2022-07-12 PROCEDURE — 80053 COMPREHEN METABOLIC PANEL: CPT | Performed by: NURSE PRACTITIONER

## 2022-07-12 PROCEDURE — 99284 EMERGENCY DEPT VISIT MOD MDM: CPT | Performed by: NURSE PRACTITIONER

## 2022-07-12 PROCEDURE — 93005 ELECTROCARDIOGRAM TRACING: CPT

## 2022-07-12 PROCEDURE — 85610 PROTHROMBIN TIME: CPT | Performed by: NURSE PRACTITIONER

## 2022-07-12 PROCEDURE — 93010 ELECTROCARDIOGRAM REPORT: CPT | Performed by: HOSPITALIST

## 2022-07-12 RX ORDER — HYDROCODONE BITARTRATE AND ACETAMINOPHEN 7.5; 325 MG/1; MG/1
1 TABLET ORAL EVERY 6 HOURS PRN
Qty: 12 TABLET | Refills: 0 | Status: SHIPPED | OUTPATIENT
Start: 2022-07-12 | End: 2022-07-12 | Stop reason: SDUPTHER

## 2022-07-12 RX ORDER — HYDROCODONE BITARTRATE AND ACETAMINOPHEN 7.5; 325 MG/1; MG/1
1 TABLET ORAL EVERY 6 HOURS PRN
Status: DISCONTINUED | OUTPATIENT
Start: 2022-07-12 | End: 2022-07-12 | Stop reason: HOSPADM

## 2022-07-12 RX ORDER — HYDROCODONE BITARTRATE AND ACETAMINOPHEN 7.5; 325 MG/1; MG/1
1 TABLET ORAL EVERY 6 HOURS PRN
Qty: 12 TABLET | Refills: 0 | Status: SHIPPED | OUTPATIENT
Start: 2022-07-12 | End: 2022-07-15

## 2022-07-12 RX ADMIN — HYDROCODONE BITARTRATE AND ACETAMINOPHEN 1 TABLET: 7.5; 325 TABLET ORAL at 04:07

## 2022-07-12 RX ADMIN — IOPAMIDOL 100 ML: 755 INJECTION, SOLUTION INTRAVENOUS at 04:07

## 2022-07-12 NOTE — DISCHARGE INSTRUCTIONS
Rest, drink plenty of water for hydration.  Return to the ER for increased shortness of breath or other concerning symptoms. Stop taking the Norco 5mg and start the Norco 7.5mg.

## 2022-07-12 NOTE — ED PROVIDER NOTES
Encounter Date: 7/12/2022       History     Chief Complaint   Patient presents with    Chest Pain     X 1 week     87 year old AAF with hx of HTN, DM, right upper lobe lung mass (adenocarcinoma), PE's, anemia, COVID, thyroid disease and obesity presents to the ER for right sided chest pain (described as heaviness) that radiates into the right side of her neck.  She reports the pain is intermittent, and had been going on for 2 weeks.  Due to her lung CA, she has chronic SOB.  However, she reports she has noticed the SOB has increased in the past 2 weeks.  She also reports nausea, denies diaphoresis.  Denies back pain, cough, fever.  Pt has hx of PE's but is not currently anticoagulated.  She is followed by Dr Epstein for cards at [facility], Dr Gant for pulmonology at [facility] and Dr Elliott for oncology at [facility].  Pt is not a surgical candidate for the lung mass, but they are looking into palliative chemo. Nursing home staff report that the patient typically does not have to ask for pain meds for her chest pain.  However, she is persistently asking for pain meds the last day or so.  Pt was seen by Dr Epstein in May 2022.  She had outpatient nuclear stress test on 5/18/22, which showed EKG portion of this study is negative for ischemia. The patient reported no chest pain during the stress test.During stress, occasional PVCs are noted. She also had outpatient echo on this same day, which showed · The left ventricle is normal in size with normal systolic function. The estimated ejection fraction is 60-65%. Normal right ventricular size with normal right ventricular systolic function. Normal left ventricular diastolic function. Mild aortic regurgitation. Mild tricuspid regurgitation. Mild pulmonic regurgitation.          The history is provided by the patient, the nursing home and medical records.   Chest Pain  The current episode started several weeks ago. Chest pain occurs intermittently. The chest pain is  unchanged. The quality of the pain is described as pressure-like. The pain radiates to the right neck. Primary symptoms include shortness of breath and nausea. Pertinent negatives for primary symptoms include no fever, no fatigue, no syncope, no cough, no wheezing, no palpitations, no abdominal pain, no vomiting, no dizziness and no altered mental status.   Pertinent negatives for associated symptoms include no claudication, no diaphoresis, no lower extremity edema, no near-syncope, no numbness, no orthopnea, no paroxysmal nocturnal dyspnea and no weakness. Risk factors include being elderly, lack of exercise, obesity, post-menopausal and sedentary lifestyle.   Her past medical history is significant for cancer, diabetes, hypertension, PE and thyroid problem.   Pertinent negatives for past medical history include no aneurysm, no anxiety/panic attacks, no aortic aneurysm, no aortic dissection, no arrhythmia, no bicuspid aortic valve, no CAD, no congenital heart disease, no connective tissue disease, no COPD, no CHF, no hyperlipidemia, no Marfan's syndrome, no MI, no mitral valve prolapse, no pacemaker, no PVD, no recent injury, no rheumatic fever, no seizures, no sickle cell disease, no sleep apnea, no spontaneous pneumothorax, no stimulant use, no strokes, no TIA and no valve disorder.     Review of patient's allergies indicates:   Allergen Reactions    Aspirin Nausea And Vomiting    Morphine Nausea Only    Propoxyphene Nausea And Vomiting     Past Medical History:   Diagnosis Date    Anemia     COVID     Diabetes mellitus, type 2     Hypertension     Lung mass     Pulmonary embolus     Tachycardia     Thyroid disease      Past Surgical History:   Procedure Laterality Date    BRONCHOSCOPY      5/4/22,     LUNG BIOPSY      6/14/22    SALPINGECTOMY       Family History   Problem Relation Age of Onset    No Known Problems Mother     No Known Problems Father     No Known Problems Sister     No  Known Problems Brother     No Known Problems Maternal Aunt     No Known Problems Maternal Uncle     No Known Problems Paternal Aunt     No Known Problems Paternal Uncle     No Known Problems Maternal Grandmother     No Known Problems Maternal Grandfather     No Known Problems Paternal Grandmother     No Known Problems Paternal Grandfather      Social History     Tobacco Use    Smoking status: Never Smoker    Smokeless tobacco: Never Used   Substance Use Topics    Alcohol use: Never    Drug use: Never     Review of Systems   Constitutional: Negative for diaphoresis, fatigue and fever.   Respiratory: Positive for shortness of breath. Negative for apnea, cough, chest tightness, wheezing and stridor.    Cardiovascular: Positive for chest pain. Negative for palpitations, orthopnea, claudication, leg swelling, syncope and near-syncope.   Gastrointestinal: Positive for nausea. Negative for abdominal pain and vomiting.   Musculoskeletal: Positive for neck pain. Negative for back pain.   Neurological: Negative for dizziness, seizures, weakness and numbness.       Physical Exam     Initial Vitals [07/12/22 1457]   BP Pulse Resp Temp SpO2   126/77 89 18 98.4 °F (36.9 °C) 95 %      MAP       --         Physical Exam    Nursing note and vitals reviewed.  Constitutional: She appears well-developed and well-nourished. She is not diaphoretic. She is Obese . She is cooperative.  Non-toxic appearance. She has a sickly appearance. She appears ill. No distress. She is not intubated.   HENT:   Head: Normocephalic and atraumatic.   Eyes: Pupils are equal, round, and reactive to light.   Neck: Neck supple.   Cardiovascular: Normal rate, regular rhythm, S1 normal, S2 normal, normal heart sounds, intact distal pulses and normal pulses. Exam reveals no gallop, no distant heart sounds and no friction rub.    No murmur heard.  Pulmonary/Chest: Effort normal and breath sounds normal. No accessory muscle usage. No apnea, no tachypnea  and no bradypnea. She is not intubated. No respiratory distress. She has no decreased breath sounds. She has no wheezes. She has no rhonchi. She has no rales. She exhibits tenderness.     Musculoskeletal:      Cervical back: Neck supple.     Neurological: She is alert and oriented to person, place, and time.   Skin: Skin is warm and dry. Capillary refill takes less than 2 seconds.   Psychiatric: She has a normal mood and affect.         Medical Screening Exam   See Full Note    ED Course   Procedures  Labs Reviewed   COMPREHENSIVE METABOLIC PANEL - Abnormal; Notable for the following components:       Result Value    CO2 34 (*)     Glucose 143 (*)     BUN 19 (*)     Creatinine 1.14 (*)     eGFR 48 (*)     All other components within normal limits   CBC WITH DIFFERENTIAL - Abnormal; Notable for the following components:    Hematocrit 35.7 (*)     MCV 71.1 (*)     MCH 24.1 (*)     RDW 18.0 (*)     Lymphocytes % 26.8 (*)     Monocytes % 7.4 (*)     All other components within normal limits   TROPONIN I - Normal   APTT - Normal   PROTIME-INR - Normal   CBC W/ AUTO DIFFERENTIAL    Narrative:     The following orders were created for panel order CBC auto differential.  Procedure                               Abnormality         Status                     ---------                               -----------         ------                     CBC with Differential[[id number]]        Abnormal            Final result                 Please view results for these tests on the individual orders.   CBC MORPHOLOGY     EKG Readings: (Independently Interpreted)   Initial Reading: No STEMI. Rhythm: Sinus Arrhythmia. Heart Rate: 85. Ectopy: No Ectopy. Conduction: 1st Degree AV Block. ST Segments: Normal ST Segments. Axis: Normal. Clinical Impression: AV Block - 1st Degree     ECG Results          EKG 12-lead (In process)  Result time 07/12/22 15:19:58    In process by Interface, Lab In [facility] (07/12/22 15:19:58)                  Narrative:    Test Reason : R07.9,    Vent. Rate : 085 BPM     Atrial Rate : 085 BPM     P-R Int : 212 ms          QRS Dur : 054 ms      QT Int : 350 ms       P-R-T Axes : 076 057 081 degrees     QTc Int : 416 ms    Sinus rhythm with 1st degree A-V block  Nonspecific ST abnormality  Abnormal ECG  When compared with ECG of 10-MAY-2022 13:48,  Premature ventricular complexes are no longer Present  Nonspecific T wave abnormality now evident in Lateral leads    Referred By:             Confirmed By:                             Imaging Results          CTA Chest Non-Coronary (PE Study) (Final result)  Result time 07/12/22 16:31:18    Final result by Eduardo Faustin MD (07/12/22 16:31:18)                 Impression:      No pulmonary embolus identified.    Similar appearance of the right upper lobe mass.  No new site of disease or significant change in the mass.      Electronically signed by: Eduardo Faustin  Date:    07/12/2022  Time:    16:31             Narrative:    EXAMINATION:  CTA CHEST NON CORONARY    CLINICAL HISTORY:  Right sided chest pain, SOB, hx Lung mass and PE's;    TECHNIQUE:  Low dose axial images, sagittal and coronal reformations were obtained from the thoracic inlet to the lung bases following the IV administration of 100 mL of Isovue 370.  Contrast timing was optimized to evaluate the pulmonary arteries.  MIP images were performed.    COMPARISON:  Chest CT 04/11/2022    FINDINGS:  No pulmonary embolus detected.    Heart normal size.  Coronary and thoracic aortic calcifications again seen.    Again seen is a right upper lobe mass with areas of calcification.  This measures approximately 3.5 x 2.6 cm which is similar to the previous study allowing for differences in technique and angulation.  There is an elevated right hemidiaphragm as before.  There is no new opacity seen within the right or left lung.  No pneumothorax.  No pleural effusion.    No appreciable adenopathy.    No acute abnormality seen of the  upper abdomen.    No acute osseous abnormality detected.                               X-Ray Chest AP Portable (Final result)  Result time 07/12/22 15:27:59    Final result by Eduardo Faustin MD (07/12/22 15:27:59)                 Impression:      Similar appearance of the chest with the right lung mass similar.      Electronically signed by: Eduardo Faustin  Date:    07/12/2022  Time:    15:27             Narrative:    EXAMINATION:  XR CHEST AP PORTABLE    CLINICAL HISTORY:  chest pain;    TECHNIQUE:  Single frontal view of the chest was performed.    COMPARISON:  Most recent radiograph 06/14/2022    FINDINGS:  The right upper lobe mass is similar.  There is an elevated right hemidiaphragm that is unchanged.  Left lung appears clear.  No pneumothorax or new abnormality detected in the chest.                                 Medications   HYDROcodone-acetaminophen 7.5-325 mg per tablet 1 tablet (has no administration in time range)   iopamidoL (ISOVUE-370) injection 100 mL (100 mLs Intravenous Given 7/12/22 1618)        Additional MDM:     Heart Failure Score:   COPD = No          ED Course as of 07/12/22 1646   Tue Jul 12, 2022   1540 Glucose(!): 143 [AG]   1541 BUN(!): 19 [AG]   1541 Creatinine(!): 1.14 [AG]   1638 Reviewed the test results with the patient.  She verbalized understanding of the results.  Pt reports she feels comfortable going back to the NH.  Pt reports this seems like it is in influx in her pain from her lung CA. [AG]   1645 Spoke with the nurse at the nursing home.  We will dc the Norco 5mg and start her on Norco 7.5mg.  Wrote RX for 3 days until she can see her PCP [AG]      ED Course User Index  [AG] LUKE Leal          Clinical Impression:   Final diagnoses:  [R07.9] Chest pain  [R07.81] Pleuritic chest pain (Primary)  [R91.8] Mass of right lung  [G89.3] Cancer related pain          ED Disposition Condition    Discharge Stable        ED Prescriptions     Medication Sig Dispense Start Date End  Date Auth. Provider    HYDROcodone-acetaminophen (NORCO) 7.5-325 mg per tablet Take 1 tablet by mouth every 6 (six) hours as needed for Pain. 12 tablet 7/12/2022 7/15/2022 LUKE Leal        Follow-up Information     Follow up With Specialties Details Why Contact Info    Charissa Haney II, MD Family Medicine Schedule an appointment as soon as possible for a visit in 1 day  [address] [place] [id number]  [phone]             LUKE Leal  07/12/22 6853

## 2022-07-13 LAB — SARS-COV-2 RNA RESP QL NAA+PROBE: NEGATIVE

## 2022-09-26 ENCOUNTER — OFFICE VISIT (OUTPATIENT)
Dept: CARDIOLOGY | Facility: CLINIC | Age: 87
End: 2022-09-26
Payer: MEDICARE

## 2022-09-26 VITALS
HEART RATE: 74 BPM | SYSTOLIC BLOOD PRESSURE: 115 MMHG | WEIGHT: 175 LBS | BODY MASS INDEX: 27.47 KG/M2 | RESPIRATION RATE: 18 BRPM | DIASTOLIC BLOOD PRESSURE: 70 MMHG | HEIGHT: 67 IN

## 2022-09-26 DIAGNOSIS — R07.9 CHEST PAIN, UNSPECIFIED TYPE: ICD-10-CM

## 2022-09-26 PROCEDURE — 99214 OFFICE O/P EST MOD 30 MIN: CPT | Mod: S$PBB,GW,ICN, | Performed by: STUDENT IN AN ORGANIZED HEALTH CARE EDUCATION/TRAINING PROGRAM

## 2022-09-26 PROCEDURE — 99214 OFFICE O/P EST MOD 30 MIN: CPT | Mod: PBBFAC | Performed by: STUDENT IN AN ORGANIZED HEALTH CARE EDUCATION/TRAINING PROGRAM

## 2022-09-26 PROCEDURE — 99214 PR OFFICE/OUTPT VISIT, EST, LEVL IV, 30-39 MIN: ICD-10-PCS | Mod: S$PBB,GW,ICN, | Performed by: STUDENT IN AN ORGANIZED HEALTH CARE EDUCATION/TRAINING PROGRAM

## 2022-09-26 NOTE — PROGRESS NOTES
PCP: Charissa Teixeira II, MD    Referring Provider:     Subjective:   Denzel Hsu is a 87 y.o. female, nonsmoker, with hx of diabetes, HTN, lungs cancer who presents for follow up. In with caretaker from nursing facility.     9/26/22 -  Patient states she is doing Ok.  Has history of lung cancer. Patient states her chest pain has decreased..    Denies CVA or MI.       EKG  5/10/22:  Sinus tachycardia.  Premature ventricular contractions.           4/11/22:  Sinus rhythm  with 1st degree A-V block.    ECHO 5/18/22 -  LVEF 60-65%.  Normal right ventricular size with normal right ventricular systolic function.                               Normal left ventricular diastolic function.   LEXISCAN 5/18/22- No evidence of acute inducible ischemia, fixed inferior wall perfusion defect is likely to be subdiaphragmatic attenuation artifact..       Past Surgical History:   Procedure Laterality Date    BRONCHOSCOPY      5/4/22,     LUNG BIOPSY      6/14/22    SALPINGECTOMY        Family History   Problem Relation Age of Onset    No Known Problems Mother     No Known Problems Father     No Known Problems Sister     No Known Problems Brother     No Known Problems Maternal Aunt     No Known Problems Maternal Uncle     No Known Problems Paternal Aunt     No Known Problems Paternal Uncle     No Known Problems Maternal Grandmother     No Known Problems Maternal Grandfather     No Known Problems Paternal Grandmother     No Known Problems Paternal Grandfather       Social History     Socioeconomic History    Marital status: Single   Tobacco Use    Smoking status: Never    Smokeless tobacco: Never   Substance and Sexual Activity    Alcohol use: Never    Drug use: Never    Sexual activity: Not Currently        Lab Results   Component Value Date     07/12/2022    K 4.3 07/12/2022     07/12/2022    CO2 34 (H) 07/12/2022    BUN 19 (H) 07/12/2022    CREATININE 1.14 (H) 07/12/2022    CALCIUM 9.1 07/12/2022    ANIONGAP 9  07/12/2022    ESTGFRAFRICA 78 12/02/2021    EGFRNONAA 48 (L) 07/12/2022       Lab Results   Component Value Date    CHOL 189 06/02/2021    CHOL 180 06/28/2020     No results found for: HDL  No results found for: LDLCALC  No results found for: TRIG  No results found for: CHOLHDL    Lab Results   Component Value Date    WBC 5.64 07/12/2022    HGB 12.1 07/12/2022    HCT 35.7 (L) 07/12/2022    MCV 71.1 (L) 07/12/2022     07/12/2022           Current Outpatient Medications:     acetaminophen (TYLENOL) 325 MG tablet, Take 325 mg by mouth every 6 (six) hours as needed for Pain., Disp: , Rfl:     amLODIPine (NORVASC) 5 MG tablet, Take 5 mg by mouth once daily., Disp: , Rfl:     calcium-vitamin D3 (OS-LORIN 500 + D3) 500 mg-5 mcg (200 unit) per tablet, Take 1 tablet by mouth 2 (two) times daily with meals., Disp: , Rfl:     carboxymethylcellulose sodium 1 % Drop, Apply to eye., Disp: , Rfl:     cyanocobalamin (VITAMIN B-12) 100 MCG tablet, Take 100 mcg by mouth once daily., Disp: , Rfl:     DULoxetine (CYMBALTA) 30 MG capsule, Take 30 mg by mouth once daily., Disp: , Rfl:     furosemide (LASIX) 20 MG tablet, Take 20 mg by mouth once daily., Disp: , Rfl:     gabapentin (NEURONTIN) 600 MG tablet, Take 600 mg by mouth 2 (two) times daily., Disp: , Rfl:     GUAIFENESIN ORAL, Take 10 mLs by mouth 4 (four) times daily as needed., Disp: , Rfl:     levothyroxine (SYNTHROID) 50 MCG tablet, Take 50 mcg by mouth once daily., Disp: , Rfl:     multivit-mins no.63/iron/folic (M-VIT ORAL), Take by mouth., Disp: , Rfl:     naproxen (NAPROSYN) 250 MG tablet, Take 500 mg by mouth 2 (two) times daily with meals., Disp: , Rfl:     olopatadine HCl (PATADAY OPHT), Apply to eye., Disp: , Rfl:     tiZANidine (ZANAFLEX) 2 MG tablet, , Disp: , Rfl:     traMADoL (ULTRAM) 50 mg tablet, Take 50 mg by mouth every 6 (six) hours as needed for Pain., Disp: , Rfl:     ondansetron (ZOFRAN) 4 MG tablet, , Disp: , Rfl:     suvorexant (BELSOMRA) 20 mg Tab,  "Take 20 mg by mouth every evening., Disp: , Rfl:        Review of Systems   Respiratory:  Positive for shortness of breath. Negative for cough.    Cardiovascular:  Negative for chest pain, palpitations, orthopnea, claudication, leg swelling and PND.   Neurological:  Positive for dizziness.       Objective:   /70   Pulse 74   Resp 18   Ht 5' 7" (1.702 m)   Wt 79.4 kg (175 lb)   BMI 27.41 kg/m²     Physical Exam  Constitutional:       General: She is not in acute distress.  Eyes:      Extraocular Movements: Extraocular movements intact.   Cardiovascular:      Rate and Rhythm: Normal rate and regular rhythm.      Pulses: Normal pulses.      Heart sounds: No murmur (III/VI systolic ) heard.  Pulmonary:      Effort: Pulmonary effort is normal.      Breath sounds: Normal breath sounds.   Abdominal:      Palpations: Abdomen is soft.   Musculoskeletal:         General: No swelling.      Cervical back: Neck supple.   Skin:     Findings: No rash.   Neurological:      Mental Status: She is alert and oriented to person, place, and time.         Assessment:     1. Chest pain, unspecified type                Plan:     Chest pain  Recently diagnosed adenocarcinoma of the lung  Not a surgical candidate as per Pulmonology note  ECHO and stress test normal  No further cardiac testing at this time.   Patient to follow up with PCP and oncology         Shortness of breath   Recently diagnosed adenocarcinoma of the lungs             "

## 2022-09-26 NOTE — ASSESSMENT & PLAN NOTE
Recently diagnosed adenocarcinoma of the lung  Not a surgical candidate as per Pulmonology note  ECHO and stress test normal  No further cardiac testing at this time.   Patient to follow up with PCP and oncology

## 2022-12-08 ENCOUNTER — LAB REQUISITION (OUTPATIENT)
Dept: LAB | Facility: HOSPITAL | Age: 87
End: 2022-12-08
Payer: MEDICARE

## 2022-12-08 DIAGNOSIS — I16.9 HYPERTENSIVE CRISIS, UNSPECIFIED: ICD-10-CM

## 2022-12-08 DIAGNOSIS — N39.0 URINARY TRACT INFECTION, SITE NOT SPECIFIED: ICD-10-CM

## 2022-12-08 LAB
ALBUMIN SERPL BCP-MCNC: 3 G/DL (ref 3.5–5)
ALBUMIN/GLOB SERPL: 1.2 {RATIO}
ALP SERPL-CCNC: 122 U/L (ref 55–142)
ALT SERPL W P-5'-P-CCNC: 13 U/L (ref 13–56)
ANION GAP SERPL CALCULATED.3IONS-SCNC: 13 MMOL/L (ref 7–16)
ANISOCYTOSIS BLD QL SMEAR: NORMAL
AST SERPL W P-5'-P-CCNC: 12 U/L (ref 15–37)
BACTERIA #/AREA URNS HPF: ABNORMAL /HPF
BASOPHILS # BLD AUTO: 0.02 K/UL (ref 0–0.2)
BASOPHILS NFR BLD AUTO: 0.3 % (ref 0–1)
BILIRUB SERPL-MCNC: 0.7 MG/DL (ref ?–1.2)
BILIRUB UR QL STRIP: ABNORMAL
BUN SERPL-MCNC: 27 MG/DL (ref 7–18)
BUN/CREAT SERPL: 17 (ref 6–20)
CALCIUM SERPL-MCNC: 8.3 MG/DL (ref 8.5–10.1)
CHLORIDE SERPL-SCNC: 104 MMOL/L (ref 98–107)
CLARITY UR: ABNORMAL
CO2 SERPL-SCNC: 32 MMOL/L (ref 21–32)
COLOR UR: ABNORMAL
CREAT SERPL-MCNC: 1.57 MG/DL (ref 0.55–1.02)
DIFFERENTIAL METHOD BLD: ABNORMAL
EGFR (NO RACE VARIABLE) (RUSH/TITUS): 32 ML/MIN/1.73M²
EOSINOPHIL # BLD AUTO: 0.1 K/UL (ref 0–0.5)
EOSINOPHIL NFR BLD AUTO: 1.4 % (ref 1–4)
ERYTHROCYTE [DISTWIDTH] IN BLOOD BY AUTOMATED COUNT: 16.5 % (ref 11.5–14.5)
FINE GRAN CASTS #/AREA URNS LPF: ABNORMAL /LPF
GLOBULIN SER-MCNC: 2.6 G/DL (ref 2–4)
GLUCOSE SERPL-MCNC: 114 MG/DL (ref 74–106)
GLUCOSE UR STRIP-MCNC: NEGATIVE MG/DL
HCT VFR BLD AUTO: 31.7 % (ref 38–47)
HGB BLD-MCNC: 11.4 G/DL (ref 12–16)
HYALINE CASTS #/AREA URNS LPF: ABNORMAL /LPF
HYPOCHROMIA BLD QL SMEAR: NORMAL
KETONES UR STRIP-SCNC: 15 MG/DL
LEUKOCYTE ESTERASE UR QL STRIP: NEGATIVE
LYMPHOCYTES # BLD AUTO: 1.01 K/UL (ref 1–4.8)
LYMPHOCYTES NFR BLD AUTO: 14 % (ref 27–41)
MCH RBC QN AUTO: 25.4 PG (ref 27–31)
MCHC RBC AUTO-ENTMCNC: 36 G/DL (ref 32–36)
MCV RBC AUTO: 70.8 FL (ref 80–96)
MICROCYTES BLD QL SMEAR: NORMAL
MONOCYTES # BLD AUTO: 0.6 K/UL (ref 0–0.8)
MONOCYTES NFR BLD AUTO: 8.3 % (ref 2–6)
MPC BLD CALC-MCNC: 11 FL (ref 9.4–12.4)
MUCOUS THREADS #/AREA URNS HPF: ABNORMAL /HPF
NEUTROPHILS # BLD AUTO: 5.51 K/UL (ref 1.8–7.7)
NEUTROPHILS NFR BLD AUTO: 76 % (ref 53–65)
NITRITE UR QL STRIP: NEGATIVE
PH UR STRIP: 5 PH UNITS
PLATELET # BLD AUTO: 235 K/UL (ref 150–400)
PLATELET MORPHOLOGY: NORMAL
POIKILOCYTOSIS BLD QL SMEAR: NORMAL
POTASSIUM SERPL-SCNC: 3.9 MMOL/L (ref 3.5–5.1)
PROT SERPL-MCNC: 5.6 G/DL (ref 6.4–8.2)
PROT UR QL STRIP: 30
RBC # BLD AUTO: 4.48 M/UL (ref 4.2–5.4)
RBC # UR STRIP: NEGATIVE /UL
RBC #/AREA URNS HPF: ABNORMAL /HPF
RENAL EPI CELLS #/AREA URNS LPF: ABNORMAL /LPF
SCHISTOCYTES BLD QL AUTO: NORMAL
SODIUM SERPL-SCNC: 145 MMOL/L (ref 136–145)
SP GR UR STRIP: >=1.03
SQUAMOUS #/AREA URNS LPF: ABNORMAL /LPF
TARGETS BLD QL SMEAR: NORMAL
TRANS CELLS #/AREA URNS LPF: ABNORMAL /LPF
UROBILINOGEN UR STRIP-ACNC: 4 MG/DL
WBC # BLD AUTO: 7.24 K/UL (ref 4.5–11)
WBC #/AREA URNS HPF: ABNORMAL /HPF

## 2022-12-08 PROCEDURE — 81003 URINALYSIS AUTO W/O SCOPE: CPT

## 2022-12-08 PROCEDURE — 82607 VITAMIN B-12: CPT

## 2022-12-08 PROCEDURE — 85025 COMPLETE CBC W/AUTO DIFF WBC: CPT

## 2022-12-08 PROCEDURE — 80053 COMPREHEN METABOLIC PANEL: CPT

## 2022-12-08 PROCEDURE — 87077 CULTURE AEROBIC IDENTIFY: CPT

## 2022-12-08 PROCEDURE — 81001 URINALYSIS AUTO W/SCOPE: CPT

## 2022-12-08 PROCEDURE — 87186 SC STD MICRODIL/AGAR DIL: CPT

## 2022-12-09 LAB — VIT B12 SERPL-MCNC: 1985 PG/ML (ref 193–986)

## 2022-12-12 LAB — UA COMPLETE W REFLEX CULTURE PNL UR: ABNORMAL

## 2022-12-19 ENCOUNTER — HOSPITAL ENCOUNTER (EMERGENCY)
Facility: HOSPITAL | Age: 87
Discharge: HOME | End: 2022-12-19
Attending: EMERGENCY MEDICINE
Payer: MEDICARE

## 2022-12-19 ENCOUNTER — LAB REQUISITION (OUTPATIENT)
Dept: LAB | Facility: HOSPITAL | Age: 87
End: 2022-12-19
Attending: FAMILY MEDICINE
Payer: MEDICARE

## 2022-12-19 VITALS
OXYGEN SATURATION: 96 % | BODY MASS INDEX: 27.47 KG/M2 | HEIGHT: 67 IN | HEART RATE: 85 BPM | SYSTOLIC BLOOD PRESSURE: 144 MMHG | TEMPERATURE: 98 F | WEIGHT: 175 LBS | DIASTOLIC BLOOD PRESSURE: 87 MMHG | RESPIRATION RATE: 19 BRPM

## 2022-12-19 DIAGNOSIS — N39.0 URINARY TRACT INFECTION, SITE NOT SPECIFIED: ICD-10-CM

## 2022-12-19 DIAGNOSIS — E87.6 HYPOKALEMIA: ICD-10-CM

## 2022-12-19 DIAGNOSIS — R53.1 WEAKNESS: Primary | ICD-10-CM

## 2022-12-19 DIAGNOSIS — R07.89 ATYPICAL CHEST PAIN: ICD-10-CM

## 2022-12-19 DIAGNOSIS — R06.02 SHORTNESS OF BREATH: ICD-10-CM

## 2022-12-19 LAB
ALBUMIN SERPL BCP-MCNC: 3.3 G/DL (ref 3.5–5)
ALBUMIN SERPL BCP-MCNC: 3.8 G/DL (ref 3.5–5)
ALBUMIN/GLOB SERPL: 1 {RATIO}
ALBUMIN/GLOB SERPL: 1.2 {RATIO}
ALP SERPL-CCNC: 121 U/L (ref 55–142)
ALP SERPL-CCNC: 136 U/L (ref 55–142)
ALT SERPL W P-5'-P-CCNC: 11 U/L (ref 13–56)
ALT SERPL W P-5'-P-CCNC: 14 U/L (ref 13–56)
ANION GAP SERPL CALCULATED.3IONS-SCNC: 11 MMOL/L (ref 7–16)
ANION GAP SERPL CALCULATED.3IONS-SCNC: 15 MMOL/L (ref 7–16)
ANISOCYTOSIS BLD QL SMEAR: NORMAL
AST SERPL W P-5'-P-CCNC: 15 U/L (ref 15–37)
AST SERPL W P-5'-P-CCNC: 19 U/L (ref 15–37)
BACTERIA #/AREA URNS HPF: ABNORMAL /HPF
BACTERIA #/AREA URNS HPF: ABNORMAL /HPF
BASOPHILS # BLD AUTO: 0.04 K/UL (ref 0–0.2)
BASOPHILS NFR BLD AUTO: 0.6 % (ref 0–1)
BILIRUB SERPL-MCNC: 0.9 MG/DL (ref ?–1.2)
BILIRUB SERPL-MCNC: 0.9 MG/DL (ref ?–1.2)
BILIRUB UR QL STRIP: ABNORMAL
BILIRUB UR QL STRIP: NEGATIVE
BUN SERPL-MCNC: 10 MG/DL (ref 7–18)
BUN SERPL-MCNC: 9 MG/DL (ref 7–18)
BUN/CREAT SERPL: 9 (ref 6–20)
BUN/CREAT SERPL: 9 (ref 6–20)
CALCIUM SERPL-MCNC: 9.1 MG/DL (ref 8.5–10.1)
CALCIUM SERPL-MCNC: 9.2 MG/DL (ref 8.5–10.1)
CHLORIDE SERPL-SCNC: 100 MMOL/L (ref 98–107)
CHLORIDE SERPL-SCNC: 99 MMOL/L (ref 98–107)
CLARITY UR: ABNORMAL
CLARITY UR: CLEAR
CO2 SERPL-SCNC: 32 MMOL/L (ref 21–32)
CO2 SERPL-SCNC: 36 MMOL/L (ref 21–32)
COLOR UR: ABNORMAL
COLOR UR: ABNORMAL
CREAT SERPL-MCNC: 0.96 MG/DL (ref 0.55–1.02)
CREAT SERPL-MCNC: 1.12 MG/DL (ref 0.55–1.02)
DIFFERENTIAL METHOD BLD: ABNORMAL
EGFR (NO RACE VARIABLE) (RUSH/TITUS): 48 ML/MIN/1.73M²
EGFR (NO RACE VARIABLE) (RUSH/TITUS): 57 ML/MIN/1.73M²
EOSINOPHIL # BLD AUTO: 0.08 K/UL (ref 0–0.5)
EOSINOPHIL NFR BLD AUTO: 1.1 % (ref 1–4)
ERYTHROCYTE [DISTWIDTH] IN BLOOD BY AUTOMATED COUNT: 15.9 % (ref 11.5–14.5)
FLUAV AG UPPER RESP QL IA.RAPID: NEGATIVE
FLUBV AG UPPER RESP QL IA.RAPID: NEGATIVE
GLOBULIN SER-MCNC: 3.3 G/DL (ref 2–4)
GLOBULIN SER-MCNC: 3.3 G/DL (ref 2–4)
GLUCOSE SERPL-MCNC: 143 MG/DL (ref 74–106)
GLUCOSE SERPL-MCNC: 181 MG/DL (ref 74–106)
GLUCOSE UR STRIP-MCNC: 30 MG/DL
GLUCOSE UR STRIP-MCNC: NEGATIVE MG/DL
HCT VFR BLD AUTO: 37.7 % (ref 38–47)
HGB BLD-MCNC: 12.8 G/DL (ref 12–16)
HYALINE CASTS #/AREA URNS LPF: ABNORMAL /LPF
IMM GRANULOCYTES # BLD AUTO: 0.02 K/UL (ref 0–0.04)
IMM GRANULOCYTES NFR BLD: 0.3 % (ref 0–0.4)
KETONES UR STRIP-SCNC: 80 MG/DL
KETONES UR STRIP-SCNC: NEGATIVE MG/DL
LEUKOCYTE ESTERASE UR QL STRIP: ABNORMAL
LEUKOCYTE ESTERASE UR QL STRIP: NEGATIVE
LIPASE SERPL-CCNC: 75 U/L (ref 73–393)
LYMPHOCYTES # BLD AUTO: 1.23 K/UL (ref 1–4.8)
LYMPHOCYTES NFR BLD AUTO: 17.1 % (ref 27–41)
MCH RBC QN AUTO: 24.7 PG (ref 27–31)
MCHC RBC AUTO-ENTMCNC: 34 G/DL (ref 32–36)
MCV RBC AUTO: 72.6 FL (ref 80–96)
MICROCYTES BLD QL SMEAR: NORMAL
MONOCYTES # BLD AUTO: 0.61 K/UL (ref 0–0.8)
MONOCYTES NFR BLD AUTO: 8.5 % (ref 2–6)
MPC BLD CALC-MCNC: 9.7 FL (ref 9.4–12.4)
MUCOUS THREADS #/AREA URNS HPF: ABNORMAL /HPF
NEUTROPHILS # BLD AUTO: 5.21 K/UL (ref 1.8–7.7)
NEUTROPHILS NFR BLD AUTO: 72.4 % (ref 53–65)
NITRITE UR QL STRIP: NEGATIVE
NITRITE UR QL STRIP: NEGATIVE
NRBC # BLD AUTO: 0 X10E3/UL
NRBC, AUTO (.00): 0 %
NT-PROBNP SERPL-MCNC: 240 PG/ML (ref 1–450)
PH UR STRIP: 7 PH UNITS
PH UR STRIP: 7 PH UNITS
PLATELET # BLD AUTO: 289 K/UL (ref 150–400)
PLATELET MORPHOLOGY: NORMAL
POTASSIUM SERPL-SCNC: 2.8 MMOL/L (ref 3.5–5.1)
POTASSIUM SERPL-SCNC: 3.1 MMOL/L (ref 3.5–5.1)
PROT SERPL-MCNC: 6.6 G/DL (ref 6.4–8.2)
PROT SERPL-MCNC: 7.1 G/DL (ref 6.4–8.2)
PROT UR QL STRIP: 100
PROT UR QL STRIP: NEGATIVE
RBC # BLD AUTO: 5.19 M/UL (ref 4.2–5.4)
RBC # UR STRIP: NEGATIVE /UL
RBC # UR STRIP: NEGATIVE /UL
RBC #/AREA URNS HPF: ABNORMAL /HPF
RBC #/AREA URNS HPF: ABNORMAL /HPF
SCHISTOCYTES BLD QL AUTO: NORMAL
SODIUM SERPL-SCNC: 143 MMOL/L (ref 136–145)
SODIUM SERPL-SCNC: 144 MMOL/L (ref 136–145)
SP GR UR STRIP: 1.01
SP GR UR STRIP: 1.02
SQUAMOUS #/AREA URNS LPF: ABNORMAL /LPF
SQUAMOUS #/AREA URNS LPF: ABNORMAL /LPF
TARGETS BLD QL SMEAR: NORMAL
TRANS CELLS #/AREA URNS LPF: ABNORMAL /LPF
TRICHOMONAS #/AREA URNS HPF: ABNORMAL /HPF
TROPONIN I SERPL HS-MCNC: 30.3 PG/ML
UROBILINOGEN UR STRIP-ACNC: 4 MG/DL
UROBILINOGEN UR STRIP-ACNC: NORMAL MG/DL
WBC # BLD AUTO: 7.19 K/UL (ref 4.5–11)
WBC #/AREA URNS HPF: ABNORMAL /HPF
WBC #/AREA URNS HPF: ABNORMAL /HPF
WBC CLUMPS, UA: ABNORMAL /HPF
YEAST #/AREA URNS HPF: ABNORMAL /HPF

## 2022-12-19 PROCEDURE — 93010 EKG 12-LEAD: ICD-10-PCS | Mod: GW,ICN,, | Performed by: INTERNAL MEDICINE

## 2022-12-19 PROCEDURE — 85025 COMPLETE CBC W/AUTO DIFF WBC: CPT | Performed by: EMERGENCY MEDICINE

## 2022-12-19 PROCEDURE — 80053 COMPREHEN METABOLIC PANEL: CPT | Performed by: EMERGENCY MEDICINE

## 2022-12-19 PROCEDURE — 81003 URINALYSIS AUTO W/O SCOPE: CPT | Performed by: FAMILY MEDICINE

## 2022-12-19 PROCEDURE — 87086 URINE CULTURE/COLONY COUNT: CPT | Performed by: FAMILY MEDICINE

## 2022-12-19 PROCEDURE — 83880 ASSAY OF NATRIURETIC PEPTIDE: CPT | Performed by: EMERGENCY MEDICINE

## 2022-12-19 PROCEDURE — 25000003 PHARM REV CODE 250: Performed by: EMERGENCY MEDICINE

## 2022-12-19 PROCEDURE — 93010 ELECTROCARDIOGRAM REPORT: CPT | Mod: GW,ICN,, | Performed by: INTERNAL MEDICINE

## 2022-12-19 PROCEDURE — 87804 INFLUENZA ASSAY W/OPTIC: CPT | Performed by: FAMILY MEDICINE

## 2022-12-19 PROCEDURE — 99284 PR EMERGENCY DEPT VISIT,LEVEL IV: ICD-10-PCS | Mod: GW,ICN,, | Performed by: EMERGENCY MEDICINE

## 2022-12-19 PROCEDURE — 83690 ASSAY OF LIPASE: CPT | Performed by: EMERGENCY MEDICINE

## 2022-12-19 PROCEDURE — 84484 ASSAY OF TROPONIN QUANT: CPT | Performed by: EMERGENCY MEDICINE

## 2022-12-19 PROCEDURE — 99285 EMERGENCY DEPT VISIT HI MDM: CPT | Mod: 25

## 2022-12-19 PROCEDURE — 99284 EMERGENCY DEPT VISIT MOD MDM: CPT | Mod: GW,ICN,, | Performed by: EMERGENCY MEDICINE

## 2022-12-19 PROCEDURE — 81003 URINALYSIS AUTO W/O SCOPE: CPT | Performed by: EMERGENCY MEDICINE

## 2022-12-19 PROCEDURE — 81001 URINALYSIS AUTO W/SCOPE: CPT | Performed by: FAMILY MEDICINE

## 2022-12-19 PROCEDURE — 36415 COLL VENOUS BLD VENIPUNCTURE: CPT | Performed by: EMERGENCY MEDICINE

## 2022-12-19 PROCEDURE — 80053 COMPREHEN METABOLIC PANEL: CPT | Performed by: FAMILY MEDICINE

## 2022-12-19 PROCEDURE — 93005 ELECTROCARDIOGRAM TRACING: CPT

## 2022-12-19 PROCEDURE — 81001 URINALYSIS AUTO W/SCOPE: CPT | Performed by: EMERGENCY MEDICINE

## 2022-12-19 RX ORDER — POTASSIUM CHLORIDE 20 MEQ/1
20 TABLET, EXTENDED RELEASE ORAL DAILY
Qty: 30 TABLET | Refills: 0 | Status: SHIPPED | OUTPATIENT
Start: 2022-12-19

## 2022-12-19 RX ORDER — POTASSIUM CHLORIDE 20 MEQ/1
40 TABLET, EXTENDED RELEASE ORAL
Status: COMPLETED | OUTPATIENT
Start: 2022-12-19 | End: 2022-12-19

## 2022-12-19 RX ADMIN — POTASSIUM CHLORIDE 40 MEQ: 1500 TABLET, EXTENDED RELEASE ORAL at 08:12

## 2022-12-20 ENCOUNTER — TELEPHONE (OUTPATIENT)
Dept: EMERGENCY MEDICINE | Facility: HOSPITAL | Age: 87
End: 2022-12-20
Payer: MEDICARE

## 2022-12-20 NOTE — ED TRIAGE NOTES
Patient reports that she has been feeling more weak and tired recently because the heat is broken in her room at [facility].

## 2022-12-20 NOTE — DISCHARGE INSTRUCTIONS
Repeat potassium levels in 2-3 days.  Return to emergency department for any worsening or further problems.  Follow up with primary care provider as needed

## 2022-12-20 NOTE — ED PROVIDER NOTES
Encounter Date: 12/19/2022    SCRIBE #1 NOTE: I, Adrianne Pinto, am scribing for, and in the presence of,  Feliciano Renee MD. I have scribed the entire note.     History     Chief Complaint   Patient presents with    Weakness     Patient is a 87 y.o. female who presents to the emergency department via EMS from [facility] with complaints of chest pain. Patient explains that 1 week ago she began experiencing chest pain. She notes that the pain is constant, radiates to her back, and worsens when breathing. She also reports abdominal pain. Patient denies any nausea, vomiting, fever, or sweating. No other symptoms were reported.    The history is provided by the patient.   Review of patient's allergies indicates:   Allergen Reactions    Aspirin Nausea And Vomiting    Morphine Nausea Only    Propoxyphene Nausea And Vomiting     Past Medical History:   Diagnosis Date    Anemia     COVID     Diabetes mellitus, type 2     Hypertension     Lung mass     Pulmonary embolus     Tachycardia     Thyroid disease      Past Surgical History:   Procedure Laterality Date    BRONCHOSCOPY      5/4/22,     LUNG BIOPSY      6/14/22    SALPINGECTOMY       Family History   Problem Relation Age of Onset    No Known Problems Mother     No Known Problems Father     No Known Problems Sister     No Known Problems Brother     No Known Problems Maternal Aunt     No Known Problems Maternal Uncle     No Known Problems Paternal Aunt     No Known Problems Paternal Uncle     No Known Problems Maternal Grandmother     No Known Problems Maternal Grandfather     No Known Problems Paternal Grandmother     No Known Problems Paternal Grandfather      Social History     Tobacco Use    Smoking status: Never    Smokeless tobacco: Never   Substance Use Topics    Alcohol use: Never    Drug use: Never     Review of Systems   Constitutional:  Negative for diaphoresis and fever.   Eyes: Negative.    Cardiovascular:  Positive for chest pain.    Gastrointestinal:  Positive for abdominal pain. Negative for nausea and vomiting.   Endocrine: Negative.    Skin: Negative.    Allergic/Immunologic: Negative.    Hematological: Negative.    Psychiatric/Behavioral: Negative.     All other systems reviewed and are negative.    Physical Exam     Initial Vitals   BP Pulse Resp Temp SpO2   12/19/22 1915 12/19/22 1915 12/19/22 1916 12/19/22 1915 12/19/22 1916   (!) 154/90 91 19 98 °F (36.7 °C) 95 %      MAP       --                Physical Exam    Nursing note and vitals reviewed.  Constitutional: She appears well-developed and well-nourished.   HENT:   Head: Normocephalic and atraumatic.   Eyes: Conjunctivae and EOM are normal. Pupils are equal, round, and reactive to light.   Cardiovascular:  Normal rate.           Pulmonary/Chest: Breath sounds normal.   Abdominal: Bowel sounds are normal. There is abdominal tenderness in the epigastric area.     Neurological: She is alert and oriented to person, place, and time.   Skin: Skin is warm and dry.   Psychiatric: She has a normal mood and affect. Thought content normal.       ED Course   Procedures  Labs Reviewed   COMPREHENSIVE METABOLIC PANEL - Abnormal; Notable for the following components:       Result Value    Potassium 2.8 (*)     CO2 36 (*)     Glucose 181 (*)     Albumin 3.3 (*)     ALT 11 (*)     eGFR 57 (*)     All other components within normal limits   CBC WITH DIFFERENTIAL - Abnormal; Notable for the following components:    Hematocrit 37.7 (*)     MCV 72.6 (*)     MCH 24.7 (*)     RDW 15.9 (*)     Neutrophils % 72.4 (*)     Lymphocytes % 17.1 (*)     Monocytes % 8.5 (*)     All other components within normal limits   URINALYSIS, REFLEX TO URINE CULTURE - Abnormal; Notable for the following components:    Leukocytes, UA Moderate (*)     Glucose, UA 30 (*)     All other components within normal limits   URINALYSIS, MICROSCOPIC - Abnormal; Notable for the following components:    Bacteria, UA Few (*)      Squamous Epithelial Cells, UA Few (*)     Transitional Epithelial Cells, UA Occasional (*)     WBC Clumps Few (*)     Mucus, UA Few (*)     Hyaline Casts, UA 0-2 (*)     All other components within normal limits   TROPONIN I - Normal   NT-PRO NATRIURETIC PEPTIDE - Normal   LIPASE - Normal   CBC W/ AUTO DIFFERENTIAL    Narrative:     The following orders were created for panel order CBC auto differential.  Procedure                               Abnormality         Status                     ---------                               -----------         ------                     CBC with Differential[[id number]]        Abnormal            Final result                 Please view results for these tests on the individual orders.   CBC MORPHOLOGY          Imaging Results              X-Ray Chest AP Portable (Final result)  Result time 12/19/22 20:19:58      Final result by Hang Dee DO (12/19/22 20:19:58)                   Impression:      As above.    Point of Service: [facility]      Electronically signed by: Hang Dee  Date:    12/19/2022  Time:    20:19               Narrative:    EXAMINATION:  XR CHEST AP PORTABLE    CLINICAL HISTORY:  CHF;    COMPARISON:  Chest x-ray July 12, 2022    TECHNIQUE:  Frontal view/views of the chest.    FINDINGS:  Continued elevation of the right hemidiaphragm.  Continued masslike density within the right lung.  There is patchy atelectasis/infiltrate within the right lower lung, new from prior.                                       Medications   potassium chloride SA CR tablet 40 mEq (40 mEq Oral Given 12/19/22 2040)                Attending Attestation:           Physician Attestation for Scribe:  Physician Attestation Statement for Scribe #1: I, Feliciano Renee MD, reviewed documentation, as scribed by Adrianne Pinto in my presence, and it is both accurate and complete.           ED Course as of 12/19/22 2133   Mon Dec 19, 2022   2009 Lipase: 75 [BB]   2009  NT-proBNP: 240 [BB]   2009 WBC: 7.19 [BB]   2009 HEMOGLOBIN: 12.8 [BB]   2009 HEMATOCRIT(!): 37.7 [BB]   2009 Platelets: 289 [BB]   2021 Potassium(!): 2.8 [BB]   2021 Glucose(!): 181 [BB]   2021 BUN: 9 [BB]   2021 Creatinine: 0.96 [BB]   2021 NT-proBNP: 240 [BB]   2021 Lipase: 75 [BB]   2021 Troponin I High Sensitivity: 30.3 [BB]   2130 WBC, UA: 0-5 [BB]   2130 RBC, UA: 0-3 [BB]   2131 On repeat examination patient denies any current complaints or pains [BB]      ED Course User Index  [BB] Feliciano Renee MD                 Clinical Impression:   Final diagnoses:  [R06.02] Shortness of breath  [R53.1] Weakness (Primary)  [R07.89] Atypical chest pain  [E87.6] Hypokalemia        ED Disposition Condition    Discharge Stable          ED Prescriptions       Medication Sig Dispense Start Date End Date Auth. Provider    potassium chloride SA (K-DUR,KLOR-CON) 20 MEQ tablet Take 1 tablet (20 mEq total) by mouth once daily. 30 tablet 12/19/2022 -- Feliciano Renee MD          Follow-up Information    None          Feliciano Renee MD  12/19/22 2133

## 2022-12-21 LAB — UA COMPLETE W REFLEX CULTURE PNL UR: NORMAL

## 2022-12-22 ENCOUNTER — HOSPITAL ENCOUNTER (OUTPATIENT)
Dept: RADIOLOGY | Facility: HOSPITAL | Age: 87
Discharge: HOME | End: 2022-12-22
Attending: FAMILY MEDICINE
Payer: MEDICARE

## 2022-12-22 DIAGNOSIS — R41.0 CONFUSION: ICD-10-CM

## 2022-12-22 PROCEDURE — 25500020 PHARM REV CODE 255

## 2022-12-22 PROCEDURE — 70470 CT HEAD/BRAIN W/O & W/DYE: CPT | Mod: TC

## 2022-12-22 RX ADMIN — IOPAMIDOL 50 ML: 755 INJECTION, SOLUTION INTRAVENOUS at 12:12

## 2023-01-20 ENCOUNTER — HOSPITAL ENCOUNTER (EMERGENCY)
Facility: HOSPITAL | Age: 88
Discharge: NURSING, REHAB OR LONG-TERM CARE | End: 2023-01-20
Attending: EMERGENCY MEDICINE
Payer: MEDICARE

## 2023-01-20 VITALS
OXYGEN SATURATION: 96 % | HEART RATE: 69 BPM | RESPIRATION RATE: 13 BRPM | TEMPERATURE: 98 F | BODY MASS INDEX: 29.13 KG/M2 | DIASTOLIC BLOOD PRESSURE: 63 MMHG | WEIGHT: 186 LBS | SYSTOLIC BLOOD PRESSURE: 113 MMHG

## 2023-01-20 DIAGNOSIS — T40.605A: Primary | ICD-10-CM

## 2023-01-20 DIAGNOSIS — R53.1 WEAKNESS: ICD-10-CM

## 2023-01-20 LAB
ACANTHOCYTES BLD QL SMEAR: NORMAL
ANION GAP SERPL CALCULATED.3IONS-SCNC: 9 MMOL/L (ref 7–16)
ANISOCYTOSIS BLD QL SMEAR: NORMAL
BASOPHILS # BLD AUTO: 0.03 K/UL (ref 0–0.2)
BASOPHILS NFR BLD AUTO: 0.4 % (ref 0–1)
BILIRUB UR QL STRIP: NEGATIVE
BUN SERPL-MCNC: 24 MG/DL (ref 7–18)
BUN/CREAT SERPL: 17 (ref 6–20)
CALCIUM SERPL-MCNC: 8.8 MG/DL (ref 8.5–10.1)
CHLORIDE SERPL-SCNC: 106 MMOL/L (ref 98–107)
CLARITY UR: CLEAR
CO2 SERPL-SCNC: 30 MMOL/L (ref 21–32)
COLOR UR: YELLOW
CREAT SERPL-MCNC: 1.42 MG/DL (ref 0.55–1.02)
CRENATED CELLS: NORMAL
DIFFERENTIAL METHOD BLD: ABNORMAL
EGFR (NO RACE VARIABLE) (RUSH/TITUS): 36 ML/MIN/1.73M²
EOSINOPHIL # BLD AUTO: 0.13 K/UL (ref 0–0.5)
EOSINOPHIL NFR BLD AUTO: 1.9 % (ref 1–4)
ERYTHROCYTE [DISTWIDTH] IN BLOOD BY AUTOMATED COUNT: 17.2 % (ref 11.5–14.5)
GLUCOSE SERPL-MCNC: 118 MG/DL (ref 74–106)
GLUCOSE UR STRIP-MCNC: NORMAL MG/DL
HCT VFR BLD AUTO: 35.4 % (ref 38–47)
HGB BLD-MCNC: 12.3 G/DL (ref 12–16)
HYPOCHROMIA BLD QL SMEAR: NORMAL
IMM GRANULOCYTES # BLD AUTO: 0.02 K/UL (ref 0–0.04)
IMM GRANULOCYTES NFR BLD: 0.3 % (ref 0–0.4)
KETONES UR STRIP-SCNC: NEGATIVE MG/DL
LEUKOCYTE ESTERASE UR QL STRIP: NEGATIVE
LYMPHOCYTES # BLD AUTO: 1.1 K/UL (ref 1–4.8)
LYMPHOCYTES NFR BLD AUTO: 15.9 % (ref 27–41)
MCH RBC QN AUTO: 24.9 PG (ref 27–31)
MCHC RBC AUTO-ENTMCNC: 34.7 G/DL (ref 32–36)
MCV RBC AUTO: 71.7 FL (ref 80–96)
MICROCYTES BLD QL SMEAR: NORMAL
MONOCYTES # BLD AUTO: 0.46 K/UL (ref 0–0.8)
MONOCYTES NFR BLD AUTO: 6.6 % (ref 2–6)
MPC BLD CALC-MCNC: 9.7 FL (ref 9.4–12.4)
NEUTROPHILS # BLD AUTO: 5.19 K/UL (ref 1.8–7.7)
NEUTROPHILS NFR BLD AUTO: 74.9 % (ref 53–65)
NITRITE UR QL STRIP: NEGATIVE
NRBC # BLD AUTO: 0 X10E3/UL
NRBC, AUTO (.00): 0 %
PH UR STRIP: 5 PH UNITS
PLATELET # BLD AUTO: 218 K/UL (ref 150–400)
PLATELET MORPHOLOGY: NORMAL
POIKILOCYTOSIS BLD QL SMEAR: NORMAL
POTASSIUM SERPL-SCNC: 4.4 MMOL/L (ref 3.5–5.1)
PROT UR QL STRIP: NEGATIVE
RBC # BLD AUTO: 4.94 M/UL (ref 4.2–5.4)
RBC # UR STRIP: NEGATIVE /UL
SCHISTOCYTES BLD QL AUTO: NORMAL
SODIUM SERPL-SCNC: 141 MMOL/L (ref 136–145)
SP GR UR STRIP: 1.01
TARGETS BLD QL SMEAR: NORMAL
UROBILINOGEN UR STRIP-ACNC: NORMAL MG/DL
WBC # BLD AUTO: 6.93 K/UL (ref 4.5–11)

## 2023-01-20 PROCEDURE — 99284 EMERGENCY DEPT VISIT MOD MDM: CPT | Mod: GW,ICN,, | Performed by: NURSE PRACTITIONER

## 2023-01-20 PROCEDURE — 85025 COMPLETE CBC W/AUTO DIFF WBC: CPT | Performed by: NURSE PRACTITIONER

## 2023-01-20 PROCEDURE — 36415 COLL VENOUS BLD VENIPUNCTURE: CPT | Performed by: NURSE PRACTITIONER

## 2023-01-20 PROCEDURE — 81003 URINALYSIS AUTO W/O SCOPE: CPT | Performed by: NURSE PRACTITIONER

## 2023-01-20 PROCEDURE — 99284 PR EMERGENCY DEPT VISIT,LEVEL IV: ICD-10-PCS | Mod: GW,ICN,, | Performed by: NURSE PRACTITIONER

## 2023-01-20 PROCEDURE — 80048 BASIC METABOLIC PNL TOTAL CA: CPT | Performed by: NURSE PRACTITIONER

## 2023-01-20 PROCEDURE — 25000003 PHARM REV CODE 250: Performed by: NURSE PRACTITIONER

## 2023-01-20 PROCEDURE — 99284 EMERGENCY DEPT VISIT MOD MDM: CPT | Mod: 25

## 2023-01-20 PROCEDURE — 96360 HYDRATION IV INFUSION INIT: CPT

## 2023-01-20 RX ADMIN — SODIUM CHLORIDE 500 ML: 9 INJECTION, SOLUTION INTRAVENOUS at 01:01

## 2023-01-20 NOTE — PROVIDER PROGRESS NOTES - EMERGENCY DEPT.
Encounter Date: 1/20/2023    ED Physician Progress Notes        MDM  87-year-old female presents to the emergency department by EMS to be evaluated because she was poorly responsive at the nursing home.  EMS was already there in the nursing home dropping off another patient when the incident occurred.  The patient received Narcan IM.  After they were able to obtain IV access, she received a dose of IV Narcan and she returned to baseline.  She takes Norco and oxycodone for chronic pain.  Patient is awake and alert on arrival to the emergency department and denies any complaints.  I ordered labs and personally reviewed them.    I ordered X-rays and personally reviewed them and reviewed the radiologist interpretation.  Xray significant for no acute process.    Dr. Sinclair has evaluated this patient and was involved in the medical decision-making  Diagnosis is adverse reaction of narcotic drug  It was recommended that her pain medication be decreased  Patient was discharged in stable condition.  Detailed return precautions discussed.  Her cousin who is her power of  is at bedside.  He reports that she is at her baseline mental status.  I discussed plan of care with him, he agree with plan of care.

## 2023-01-20 NOTE — DISCHARGE INSTRUCTIONS
Your pain medications need to be decreased; this should be discussed with your primary care provider.  Return to the emergency department for any increase in symptoms or for any other new or worrisome symptoms.

## 2023-01-20 NOTE — ED PROVIDER NOTES
Encounter Date: 1/20/2023       History     Chief Complaint   Patient presents with    Ingestion     87-year-old female presents to the emergency department by EMS to be evaluated because she was poorly responsive at the nursing home.  EMS was already there in the nursing home dropping off another patient when the incident occurred.  The patient received Narcan IM.  After they were able to obtain IV access, she received a dose of IV Narcan and she returned to baseline.  She takes Norco and oxycodone for chronic pain.  Patient is awake and alert on arrival to the emergency department and denies any complaints.    The history is provided by the patient.   Review of patient's allergies indicates:   Allergen Reactions    Aspirin Nausea And Vomiting    Morphine Nausea Only    Propoxyphene Nausea And Vomiting     Past Medical History:   Diagnosis Date    Anemia     COVID     Diabetes mellitus, type 2     Hypertension     Lung mass     Pulmonary embolus     Tachycardia     Thyroid disease      Past Surgical History:   Procedure Laterality Date    BRONCHOSCOPY      5/4/22,     LUNG BIOPSY      6/14/22    SALPINGECTOMY       Family History   Problem Relation Age of Onset    No Known Problems Mother     No Known Problems Father     No Known Problems Sister     No Known Problems Brother     No Known Problems Maternal Aunt     No Known Problems Maternal Uncle     No Known Problems Paternal Aunt     No Known Problems Paternal Uncle     No Known Problems Maternal Grandmother     No Known Problems Maternal Grandfather     No Known Problems Paternal Grandmother     No Known Problems Paternal Grandfather      Social History     Tobacco Use    Smoking status: Never    Smokeless tobacco: Never   Substance Use Topics    Alcohol use: Never    Drug use: Never     Review of Systems   Constitutional:  Negative for chills and fever.   Respiratory:  Negative for cough and shortness of breath.    Cardiovascular:  Negative for chest  pain and leg swelling.   All other systems reviewed and are negative.    Physical Exam     Initial Vitals   BP Pulse Resp Temp SpO2   01/20/23 1151 01/20/23 1151 01/20/23 1151 01/20/23 1149 01/20/23 1151   137/68 72 18 97.8 °F (36.6 °C) 95 %      MAP       --                Physical Exam    Vitals reviewed.  Constitutional: She appears well-developed and well-nourished.   Neck: Neck supple.   Cardiovascular:  Normal rate and regular rhythm.           Pulmonary/Chest: Breath sounds normal.   Abdominal: Abdomen is soft. Bowel sounds are normal. She exhibits no distension and no mass. There is no abdominal tenderness. There is no rebound and no guarding.   Musculoskeletal:         General: Normal range of motion.      Cervical back: Neck supple.     Neurological: She is alert and oriented to person, place, and time. She has normal strength. GCS score is 15. GCS eye subscore is 4. GCS verbal subscore is 5. GCS motor subscore is 6.   Skin: Skin is warm and dry. Capillary refill takes less than 2 seconds.   Psychiatric: She has a normal mood and affect.       Medical Screening Exam   See Full Note    ED Course   Procedures  Labs Reviewed   BASIC METABOLIC PANEL - Abnormal; Notable for the following components:       Result Value    Glucose 118 (*)     BUN 24 (*)     Creatinine 1.42 (*)     eGFR 36 (*)     All other components within normal limits   CBC WITH DIFFERENTIAL - Abnormal; Notable for the following components:    Hematocrit 35.4 (*)     MCV 71.7 (*)     MCH 24.9 (*)     RDW 17.2 (*)     Neutrophils % 74.9 (*)     Lymphocytes % 15.9 (*)     Monocytes % 6.6 (*)     All other components within normal limits   CBC W/ AUTO DIFFERENTIAL    Narrative:     The following orders were created for panel order CBC auto differential.  Procedure                               Abnormality         Status                     ---------                               -----------         ------                     CBC with  Differential[[id number]]        Abnormal            Final result                 Please view results for these tests on the individual orders.   URINALYSIS   CBC MORPHOLOGY          Imaging Results              X-Ray Chest 1 View (Final result)  Result time 01/20/23 12:22:09      Final result by Eduardo Faustin MD (01/20/23 12:22:09)                   Impression:      Similar appearance of the right-sided opacities and pleural fluid.      Electronically signed by: Eduardo Faustin  Date:    01/20/2023  Time:    12:22               Narrative:    EXAMINATION:  XR CHEST 1 VIEW    CLINICAL HISTORY:  Weakness    TECHNIQUE:  Single frontal view of the chest was performed.    COMPARISON:  12/09/2022    FINDINGS:  There is a right-sided pleural effusion and right-sided opacities similar to prior exam.  The left lung is predominantly clear.  No pneumothorax.                                       Medications   sodium chloride 0.9% bolus 500 mL 500 mL (500 mLs Intravenous New Bag 1/20/23 1316)                       Clinical Impression:   Final diagnoses:  [R53.1] Weakness        ED Disposition Condition    Discharge Stable          ED Prescriptions    None       Follow-up Information    None          Ewelina Singh, LUKE  01/20/23 1412